# Patient Record
Sex: FEMALE | Race: BLACK OR AFRICAN AMERICAN | NOT HISPANIC OR LATINO | Employment: FULL TIME | URBAN - METROPOLITAN AREA
[De-identification: names, ages, dates, MRNs, and addresses within clinical notes are randomized per-mention and may not be internally consistent; named-entity substitution may affect disease eponyms.]

---

## 2022-06-22 ENCOUNTER — OFFICE VISIT (OUTPATIENT)
Dept: FAMILY MEDICINE | Facility: CLINIC | Age: 42
End: 2022-06-22
Payer: COMMERCIAL

## 2022-06-22 VITALS
BODY MASS INDEX: 39.75 KG/M2 | HEIGHT: 62 IN | WEIGHT: 216 LBS | TEMPERATURE: 99 F | SYSTOLIC BLOOD PRESSURE: 132 MMHG | HEART RATE: 84 BPM | OXYGEN SATURATION: 97 % | DIASTOLIC BLOOD PRESSURE: 84 MMHG

## 2022-06-22 DIAGNOSIS — R60.0 BILATERAL LOWER EXTREMITY EDEMA: ICD-10-CM

## 2022-06-22 DIAGNOSIS — Z76.89 ESTABLISHING CARE WITH NEW DOCTOR, ENCOUNTER FOR: Primary | ICD-10-CM

## 2022-06-22 DIAGNOSIS — Z79.899 ENCOUNTER FOR LONG-TERM (CURRENT) USE OF OTHER MEDICATIONS: ICD-10-CM

## 2022-06-22 DIAGNOSIS — Z12.31 OTHER SCREENING MAMMOGRAM: ICD-10-CM

## 2022-06-22 DIAGNOSIS — Z11.59 ENCOUNTER FOR HEPATITIS C SCREENING TEST FOR LOW RISK PATIENT: ICD-10-CM

## 2022-06-22 DIAGNOSIS — U07.1 COVID-19 VIRUS INFECTION: ICD-10-CM

## 2022-06-22 LAB
CTP QC/QA: YES
SARS-COV-2 RDRP RESP QL NAA+PROBE: POSITIVE

## 2022-06-22 PROCEDURE — 1160F PR REVIEW ALL MEDS BY PRESCRIBER/CLIN PHARMACIST DOCUMENTED: ICD-10-PCS | Mod: CPTII,S$GLB,, | Performed by: PHYSICIAN ASSISTANT

## 2022-06-22 PROCEDURE — 3079F PR MOST RECENT DIASTOLIC BLOOD PRESSURE 80-89 MM HG: ICD-10-PCS | Mod: CPTII,S$GLB,, | Performed by: PHYSICIAN ASSISTANT

## 2022-06-22 PROCEDURE — 99386 PREV VISIT NEW AGE 40-64: CPT | Mod: S$GLB,,, | Performed by: PHYSICIAN ASSISTANT

## 2022-06-22 PROCEDURE — U0002 COVID-19 LAB TEST NON-CDC: HCPCS | Mod: QW,S$GLB,, | Performed by: PHYSICIAN ASSISTANT

## 2022-06-22 PROCEDURE — 3008F BODY MASS INDEX DOCD: CPT | Mod: CPTII,S$GLB,, | Performed by: PHYSICIAN ASSISTANT

## 2022-06-22 PROCEDURE — U0002: ICD-10-PCS | Mod: QW,S$GLB,, | Performed by: PHYSICIAN ASSISTANT

## 2022-06-22 PROCEDURE — 1159F PR MEDICATION LIST DOCUMENTED IN MEDICAL RECORD: ICD-10-PCS | Mod: CPTII,S$GLB,, | Performed by: PHYSICIAN ASSISTANT

## 2022-06-22 PROCEDURE — 3079F DIAST BP 80-89 MM HG: CPT | Mod: CPTII,S$GLB,, | Performed by: PHYSICIAN ASSISTANT

## 2022-06-22 PROCEDURE — 3075F PR MOST RECENT SYSTOLIC BLOOD PRESS GE 130-139MM HG: ICD-10-PCS | Mod: CPTII,S$GLB,, | Performed by: PHYSICIAN ASSISTANT

## 2022-06-22 PROCEDURE — 3008F PR BODY MASS INDEX (BMI) DOCUMENTED: ICD-10-PCS | Mod: CPTII,S$GLB,, | Performed by: PHYSICIAN ASSISTANT

## 2022-06-22 PROCEDURE — 1160F RVW MEDS BY RX/DR IN RCRD: CPT | Mod: CPTII,S$GLB,, | Performed by: PHYSICIAN ASSISTANT

## 2022-06-22 PROCEDURE — 99386 PR PREVENTIVE VISIT,NEW,40-64: ICD-10-PCS | Mod: S$GLB,,, | Performed by: PHYSICIAN ASSISTANT

## 2022-06-22 PROCEDURE — 1159F MED LIST DOCD IN RCRD: CPT | Mod: CPTII,S$GLB,, | Performed by: PHYSICIAN ASSISTANT

## 2022-06-22 PROCEDURE — 3075F SYST BP GE 130 - 139MM HG: CPT | Mod: CPTII,S$GLB,, | Performed by: PHYSICIAN ASSISTANT

## 2022-06-22 NOTE — PROGRESS NOTES
"  SUBJECTIVE:    Patient ID: Louisa Soriano is a 41 y.o. female.    Chief Complaint: Establish Care (No bottles, not taking any meds/ here to establish care/ has mammogram and pap scheduled 7/12/22 with Dr. Coles/ marilee), Sore Throat (Sore throat x2days, started having coughing/ lc), and Foot Pain (Has swelling of bilat feet swelling on days she works, she is constantly on her feet/ lc)    Pt is a 41 y.o. female who presents today as a new patient to establish care. Her PMHx, SocHx, SugHx, and FamHx was reviewed and documented in her chart.  She does not have any previous PCP and was recommended to establish care by her GYN doctor.  Currently, she is a  at [employer].  She does not follow any particular diet and averages 1 meal/day with frequent snacks ("junk food") in between.  She exercises regularly, as she rides a [employer] 3x/week for 20-30 minutes/session.  She denies any smoking history or recreational drug use.  She does not drink EtOH.    Overall, she reports feeling well but does report an acute onset of a sore throat that began 2 days ago.  Accompanied with a sore throat is a non-productive cough. Nothing tends to exacerbated these symptoms, and the cough tends to be intermittently present throughout the day.  At this time, she has been using cough drops daily, but it is only minimally efficacious.  She denies being exposed to any known sick contacts, experiencing fevers, chills, headaches, myalgia, or diarrhea.    Dr. Coles (GYN) - manages her PAPs and mammograms q.d., both scheduled to be completed on 07/12/2022.    She subjectively reports having a UTD mammogram and reports it was negative.  These records are not made available today, but will be requested.    She has received 2 doses of the Moderna COVID-19 vaccine.     Office Visit on 06/22/2022   Component Date Value Ref Range Status    POC Rapid COVID 06/22/2022 Positive (A) Negative Final     Acceptable 06/22/2022 Yes   " "Final       History reviewed. No pertinent past medical history.  Social History     Socioeconomic History    Marital status: Single    Number of children: 2   Occupational History    Occupation:    Tobacco Use    Smoking status: Never Smoker    Smokeless tobacco: Never Used   Substance and Sexual Activity    Alcohol use: Not Currently    Drug use: Never     Past Surgical History:   Procedure Laterality Date     SECTION      REDUCTION OF BOTH BREASTS      TUBAL LIGATION       Family History   Problem Relation Age of Onset    Diabetes Mother     No Known Problems Father     Hypertension Sister     Hypertension Sister        Review of patient's allergies indicates:  No Known Allergies    Current Outpatient Medications:     nirmatrelvir-ritonavir 150 mg x 2- 100 mg copackaged tablets (EUA), Take 3 tablets by mouth 2 (two) times daily for 5 days. Each dose contains 2 nirmatrelvir (pink tablets) and 1 ritonavir (white tablet). Take all 3 tablets together, Disp: 30 tablet, Rfl: 0    Review of Systems   Constitutional: Negative for activity change, chills, fatigue and fever.   HENT: Positive for congestion, postnasal drip and sore throat. Negative for ear discharge, ear pain and rhinorrhea.    Respiratory: Positive for cough. Negative for shortness of breath and wheezing.    Cardiovascular: Positive for leg swelling ("After being on my feet all day. It is worse at the end of the day."). Negative for chest pain and palpitations.   Gastrointestinal: Negative for abdominal pain, constipation, diarrhea, nausea and vomiting.   Genitourinary: Negative for difficulty urinating, dysuria, frequency, pelvic pain and urgency.   Musculoskeletal: Negative for arthralgias and myalgias.   Neurological: Negative for dizziness, syncope, weakness, light-headedness and headaches.   Psychiatric/Behavioral: Negative for behavioral problems.          Objective:      Vitals:    22 0854   BP: 132/84 " "  Pulse: 84   Temp: 99 °F (37.2 °C)   SpO2: 97%   Weight: 98 kg (216 lb)   Height: 5' 2" (1.575 m)     Physical Exam  Vitals and nursing note reviewed.   Constitutional:       General: She is not in acute distress.     Appearance: Normal appearance. She is obese. She is not ill-appearing, toxic-appearing or diaphoretic.      Comments: Morbidly obese AAF sitting erect in office chair in no acute distress.   HENT:      Head: Normocephalic and atraumatic.      Right Ear: Tympanic membrane, ear canal and external ear normal. There is no impacted cerumen.      Left Ear: Tympanic membrane, ear canal and external ear normal. There is no impacted cerumen.      Nose: Nose normal. No congestion or rhinorrhea.      Mouth/Throat:      Mouth: Mucous membranes are moist.      Pharynx: Oropharynx is clear. Posterior oropharyngeal erythema present. No oropharyngeal exudate.   Eyes:      General: No scleral icterus.     Extraocular Movements: Extraocular movements intact.      Conjunctiva/sclera: Conjunctivae normal.      Pupils: Pupils are equal, round, and reactive to light.   Cardiovascular:      Rate and Rhythm: Normal rate and regular rhythm.      Pulses: Normal pulses.      Heart sounds: Normal heart sounds. No murmur heard.    No friction rub.   Pulmonary:      Effort: Pulmonary effort is normal. No respiratory distress.      Breath sounds: Normal breath sounds. No wheezing, rhonchi or rales.      Comments: Breathing comfortably on room air with no use of respiratory accessory muscles noted on inspection.  Adequate breath sounds appreciated throughout the diffuse bilateral lung fields with no signs of consolidation noted.  Abdominal:      General: There is no distension.      Palpations: Abdomen is soft.      Tenderness: There is no abdominal tenderness. There is no guarding or rebound.   Musculoskeletal:         General: No tenderness. Normal range of motion.      Cervical back: Normal range of motion and neck supple. No " rigidity.      Right lower leg: Edema (trace) present.      Left lower leg: Edema (trace) present.   Skin:     General: Skin is warm and dry.      Coloration: Skin is not jaundiced or pale.      Findings: No erythema or rash.   Neurological:      General: No focal deficit present.      Mental Status: She is alert. Mental status is at baseline.      Cranial Nerves: No cranial nerve deficit.      Coordination: Coordination normal.      Gait: Gait normal.   Psychiatric:         Mood and Affect: Mood normal.         Behavior: Behavior normal. Behavior is cooperative.           Assessment:       1. Establishing care with new doctor, encounter for    2. COVID-19 virus infection    3. Other screening mammogram    4. BMI 39.0-39.9,adult    5. Encounter for long-term (current) use of other medications    6. Encounter for hepatitis C screening test for low risk patient         Plan:       Establishing care with new doctor, encounter for  Comments:  Baseline lab work ordered and to be completed when patient is fasting.      Encounter for hepatitis-C screening in low risk patient  Patient amenable to having Hepatitis-C screening completed on her lab work.    Hepatitis C antibody screening ordered today and to be completed on fasting lab work.    COVID-19 virus infection  Comments:  POCT COVID-19 swab today - POSITIVE.  Start Paxlovid EUA therapy today - prescription sent to [facility].  Start OTC Mucinex-DM b.i.d. x 7-10 days.  Start OTC Zyrtec 10 mg q.d. and Flonase nasal spray q.h.s..  Take OTC Tylenol/NSAIDs for fever control, headache, or myalgias relief.  Remain well hydrated, drinking 6-8, 8 oz cups of water daily.  If symptoms worsen, present to the ER immediately.  Orders:  -     POCT COVID-19 Rapid Screening  -     nirmatrelvir-ritonavir 150 mg x 2- 100 mg copackaged tablets (EUA); Take 3 tablets by mouth 2 (two) times daily for 5 days. Each dose contains 2 nirmatrelvir (pink tablets) and 1 ritonavir (white  tablet). Take all 3 tablets together  Dispense: 30 tablet; Refill: 0    Bilateral lower extremity edema  Trace amounts noted on physical exam.  Begin following a low-salt diet.  Begin elevating lower extremities above heart level when stationary.  Begin wearing compression stockings daily.  If edema worsens, will consider starting diuretics in the future.    Other screening mammogram  Comments:  Mammogram to be completed with Dr. Coles (GYN) on 07/12/2022.  Last mammogram report requested today.    BMI 39.0-39.9,adult  Comments:  Begin following a dash diet and increase daily exercise.  Patient interested in starting weight loss medication.  Will follow-up in 6 weeks to discuss starting Adipex therapy for weight loss.    Patient reports her Pap smear will be completed on her visit with her GYN on 07/12/2022.    Follow up in about 6 weeks (around 8/3/2022) for discuss Adipex.        6/22/2022 Alvarado Reyes PA-C

## 2022-07-28 ENCOUNTER — TELEPHONE (OUTPATIENT)
Dept: FAMILY MEDICINE | Facility: CLINIC | Age: 42
End: 2022-07-28

## 2022-08-16 ENCOUNTER — TELEPHONE (OUTPATIENT)
Dept: FAMILY MEDICINE | Facility: CLINIC | Age: 42
End: 2022-08-16

## 2022-08-16 NOTE — TELEPHONE ENCOUNTER
I did not call her this morning. Called patient and she said she saw Dr Villafuerte this morning and her blood pressure was high. Said she needs appointment asap. I also informed her she is due for fasting lab and urine, orders at [facility].

## 2022-08-16 NOTE — TELEPHONE ENCOUNTER
----- Message from Zulema Cisneros MA sent at 8/16/2022 11:50 AM CDT -----  Regarding: returning your call  Patient returning your call from this am  [phone]

## 2022-08-17 ENCOUNTER — OFFICE VISIT (OUTPATIENT)
Dept: FAMILY MEDICINE | Facility: CLINIC | Age: 42
End: 2022-08-17
Payer: COMMERCIAL

## 2022-08-17 VITALS
HEIGHT: 62 IN | DIASTOLIC BLOOD PRESSURE: 96 MMHG | SYSTOLIC BLOOD PRESSURE: 134 MMHG | HEART RATE: 100 BPM | BODY MASS INDEX: 41.04 KG/M2 | WEIGHT: 223 LBS

## 2022-08-17 DIAGNOSIS — E66.01 CLASS 3 SEVERE OBESITY DUE TO EXCESS CALORIES WITHOUT SERIOUS COMORBIDITY WITH BODY MASS INDEX (BMI) OF 40.0 TO 44.9 IN ADULT: ICD-10-CM

## 2022-08-17 DIAGNOSIS — F41.9 ANXIETY: ICD-10-CM

## 2022-08-17 DIAGNOSIS — I10 PRIMARY HYPERTENSION: Primary | ICD-10-CM

## 2022-08-17 DIAGNOSIS — I10 ESSENTIAL HYPERTENSION: ICD-10-CM

## 2022-08-17 DIAGNOSIS — F41.1 GENERALIZED ANXIETY DISORDER: ICD-10-CM

## 2022-08-17 PROBLEM — E66.813 CLASS 3 SEVERE OBESITY DUE TO EXCESS CALORIES WITHOUT SERIOUS COMORBIDITY WITH BODY MASS INDEX (BMI) OF 40.0 TO 44.9 IN ADULT: Status: ACTIVE | Noted: 2022-08-17

## 2022-08-17 PROCEDURE — 3075F SYST BP GE 130 - 139MM HG: CPT | Mod: CPTII,S$GLB,, | Performed by: FAMILY MEDICINE

## 2022-08-17 PROCEDURE — 1159F PR MEDICATION LIST DOCUMENTED IN MEDICAL RECORD: ICD-10-PCS | Mod: CPTII,S$GLB,, | Performed by: FAMILY MEDICINE

## 2022-08-17 PROCEDURE — 3008F BODY MASS INDEX DOCD: CPT | Mod: CPTII,S$GLB,, | Performed by: FAMILY MEDICINE

## 2022-08-17 PROCEDURE — 3080F DIAST BP >= 90 MM HG: CPT | Mod: CPTII,S$GLB,, | Performed by: FAMILY MEDICINE

## 2022-08-17 PROCEDURE — 1159F MED LIST DOCD IN RCRD: CPT | Mod: CPTII,S$GLB,, | Performed by: FAMILY MEDICINE

## 2022-08-17 PROCEDURE — 3075F PR MOST RECENT SYSTOLIC BLOOD PRESS GE 130-139MM HG: ICD-10-PCS | Mod: CPTII,S$GLB,, | Performed by: FAMILY MEDICINE

## 2022-08-17 PROCEDURE — 99213 OFFICE O/P EST LOW 20 MIN: CPT | Mod: S$GLB,,, | Performed by: FAMILY MEDICINE

## 2022-08-17 PROCEDURE — 99213 PR OFFICE/OUTPT VISIT, EST, LEVL III, 20-29 MIN: ICD-10-PCS | Mod: S$GLB,,, | Performed by: FAMILY MEDICINE

## 2022-08-17 PROCEDURE — 3080F PR MOST RECENT DIASTOLIC BLOOD PRESSURE >= 90 MM HG: ICD-10-PCS | Mod: CPTII,S$GLB,, | Performed by: FAMILY MEDICINE

## 2022-08-17 PROCEDURE — 3008F PR BODY MASS INDEX (BMI) DOCUMENTED: ICD-10-PCS | Mod: CPTII,S$GLB,, | Performed by: FAMILY MEDICINE

## 2022-08-17 RX ORDER — ALPRAZOLAM 0.25 MG/1
0.25 TABLET ORAL NIGHTLY PRN
Qty: 30 TABLET | Refills: 0 | Status: SHIPPED | OUTPATIENT
Start: 2022-08-17 | End: 2022-09-16

## 2022-08-17 RX ORDER — HYDROCHLOROTHIAZIDE 25 MG/1
TABLET ORAL
Qty: 30 TABLET | Refills: 3 | Status: SHIPPED | OUTPATIENT
Start: 2022-08-17

## 2022-08-17 NOTE — PROGRESS NOTES
SUBJECTIVE:    Patient ID: Louisa Soriano is a 41 y.o. female.    Chief Complaint: Follow-up (No medicine // discuss about Blood Pressure // Mammogram done abc )    This 41-year-old female has a family history of hypertension in the mother and sister.  For 1 week she has had somewhat of a headache.  Had a recent gyn visit her blood pressure was elevated to 170/106.  She was told to go see her primary care and get this blood pressure addressed.  She has gained 7-10 lb over the last several months.  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    She loves salty foods and occasionally gets pedal edema after a long day of standing at work.    [email or web address] bike 30 minutes p.r.n. for cardio.    History of partial hysterectomy years ago.  Still has her ovaries.      Office Visit on 2022   Component Date Value Ref Range Status    POC Rapid COVID 2022 Positive (A) Negative Final     Acceptable 2022 Yes   Final       No past medical history on file.  Social History     Socioeconomic History    Marital status: Single    Number of children: 2   Occupational History    Occupation:    Tobacco Use    Smoking status: Never Smoker    Smokeless tobacco: Never Used   Substance and Sexual Activity    Alcohol use: Not Currently    Drug use: Never     Past Surgical History:   Procedure Laterality Date     SECTION      REDUCTION OF BOTH BREASTS      TUBAL LIGATION       Family History   Problem Relation Age of Onset    Diabetes Mother     No Known Problems Father     Hypertension Sister     Hypertension Sister        Review of patient's allergies indicates:  No Known Allergies    Current Outpatient Medications:     ALPRAZolam (XANAX) 0.25 MG tablet, Take 1 tablet (0.25 mg total) by mouth nightly as needed for Anxiety., Disp: 30 tablet, Rfl: 0    hydroCHLOROthiazide (HYDRODIURIL) 25 MG tablet, Take 1 tablet 3 times a week, Disp: 30 tablet, Rfl:  "3    Review of Systems   Constitutional: Negative for appetite change, chills, fatigue, fever and unexpected weight change.   HENT: Negative for congestion, ear pain, sinus pain, sore throat and trouble swallowing.    Eyes: Negative for pain, discharge and visual disturbance.   Respiratory: Negative for apnea, cough, shortness of breath and wheezing.    Cardiovascular: Negative for chest pain, palpitations and leg swelling.   Gastrointestinal: Negative for abdominal pain, blood in stool, constipation, diarrhea, nausea and vomiting.   Endocrine: Negative for heat intolerance, polydipsia and polyuria.   Genitourinary: Negative for difficulty urinating, dyspareunia, dysuria, frequency, hematuria and menstrual problem.   Musculoskeletal: Negative for arthralgias, back pain, gait problem, joint swelling and myalgias.   Allergic/Immunologic: Negative for environmental allergies, food allergies and immunocompromised state.   Neurological: Positive for headaches. Negative for dizziness, tremors, seizures and numbness.   Psychiatric/Behavioral: Negative for behavioral problems, confusion, hallucinations and suicidal ideas. The patient is not nervous/anxious.           Objective:      Vitals:    08/17/22 1416   BP: (!) 134/96   Pulse: 100   Weight: 101.2 kg (223 lb)   Height: 5' 2" (1.575 m)     Physical Exam  Vitals and nursing note reviewed.   Constitutional:       General: She is not in acute distress.     Appearance: She is well-developed. She is obese. She is not toxic-appearing.   HENT:      Head: Normocephalic and atraumatic.      Right Ear: Tympanic membrane and external ear normal.      Left Ear: Tympanic membrane and external ear normal.      Nose: Nose normal.      Mouth/Throat:      Pharynx: Oropharynx is clear.   Eyes:      Pupils: Pupils are equal, round, and reactive to light.   Neck:      Thyroid: No thyromegaly.      Vascular: No carotid bruit.   Cardiovascular:      Rate and Rhythm: Normal rate and regular " rhythm.      Heart sounds: Normal heart sounds. No murmur heard.  Pulmonary:      Effort: Pulmonary effort is normal.      Breath sounds: Normal breath sounds. No wheezing or rales.   Abdominal:      General: Bowel sounds are normal. There is no distension.      Palpations: Abdomen is soft.      Tenderness: There is no abdominal tenderness.   Musculoskeletal:         General: No tenderness or deformity. Normal range of motion.      Cervical back: Normal range of motion and neck supple.      Lumbar back: Normal. No spasms.      Comments: Bends 90 degrees at  waist shoulders and knees good range of motion, trace pedal edema around the ankles.   Lymphadenopathy:      Cervical: No cervical adenopathy.   Skin:     General: Skin is warm and dry.      Findings: No rash.   Neurological:      Mental Status: She is alert and oriented to person, place, and time.      Cranial Nerves: No cranial nerve deficit.      Coordination: Coordination normal.   Psychiatric:         Mood and Affect: Mood normal.         Behavior: Behavior normal.         Thought Content: Thought content normal.         Judgment: Judgment normal.      Comments: Some anxiety present           Assessment:       1. Primary hypertension    2. Anxiety    3. Essential hypertension    4. Class 3 severe obesity due to excess calories without serious comorbidity with body mass index (BMI) of 40.0 to 44.9 in adult    5. Generalized anxiety disorder         Plan:       Primary hypertension  -     hydroCHLOROthiazide (HYDRODIURIL) 25 MG tablet; Take 1 tablet 3 times a week  Dispense: 30 tablet; Refill: 3  Will treat with hydrochlorothiazide 25 mg 3 days a week and p.r.n. fluid/ continue weight loss and exercise efforts  Anxiety  -     ALPRAZolam (XANAX) 0.25 MG tablet; Take 1 tablet (0.25 mg total) by mouth nightly as needed for Anxiety.  Dispense: 30 tablet; Refill: 0  Add Xanax at night as needed  Essential hypertension    Class 3 severe obesity due to excess  calories without serious comorbidity with body mass index (BMI) of 40.0 to 44.9 in adult  Recommended weight loss and carb reduction for patient.  She needs to lose  7 lb.  Routine labs scheduled to be done this week at [facility]  Generalized anxiety disorder  Xanax prescribed    No follow-ups on file.        8/17/2022 Rhett Mills

## 2023-06-29 PROBLEM — N39.3 FEMALE STRESS INCONTINENCE: Status: ACTIVE | Noted: 2018-05-08

## 2023-06-29 PROBLEM — Z80.3 FAMILY HISTORY OF BREAST CANCER: Status: ACTIVE | Noted: 2021-03-11

## 2024-06-05 ENCOUNTER — OFFICE VISIT (OUTPATIENT)
Dept: FAMILY MEDICINE | Facility: CLINIC | Age: 44
End: 2024-06-05
Payer: COMMERCIAL

## 2024-06-05 VITALS
HEART RATE: 86 BPM | OXYGEN SATURATION: 99 % | BODY MASS INDEX: 39.01 KG/M2 | WEIGHT: 212 LBS | DIASTOLIC BLOOD PRESSURE: 86 MMHG | HEIGHT: 62 IN | SYSTOLIC BLOOD PRESSURE: 134 MMHG

## 2024-06-05 DIAGNOSIS — E55.9 VITAMIN D DEFICIENCY: ICD-10-CM

## 2024-06-05 DIAGNOSIS — I10 PRIMARY HYPERTENSION: Primary | ICD-10-CM

## 2024-06-05 DIAGNOSIS — Z12.31 OTHER SCREENING MAMMOGRAM: ICD-10-CM

## 2024-06-05 DIAGNOSIS — Z13.220 SCREENING CHOLESTEROL LEVEL: ICD-10-CM

## 2024-06-05 DIAGNOSIS — Z13.1 SCREENING FOR DIABETES MELLITUS: ICD-10-CM

## 2024-06-05 DIAGNOSIS — Z79.899 ENCOUNTER FOR LONG-TERM (CURRENT) USE OF OTHER MEDICATIONS: ICD-10-CM

## 2024-06-05 DIAGNOSIS — Z00.00 WELLNESS EXAMINATION: ICD-10-CM

## 2024-06-05 DIAGNOSIS — F41.1 GENERALIZED ANXIETY DISORDER: ICD-10-CM

## 2024-06-05 DIAGNOSIS — Z51.81 ENCOUNTER FOR THERAPEUTIC DRUG MONITORING: ICD-10-CM

## 2024-06-05 PROCEDURE — 1159F MED LIST DOCD IN RCRD: CPT | Mod: CPTII,S$GLB,,

## 2024-06-05 PROCEDURE — 3066F NEPHROPATHY DOC TX: CPT | Mod: CPTII,S$GLB,,

## 2024-06-05 PROCEDURE — 99214 OFFICE O/P EST MOD 30 MIN: CPT | Mod: S$GLB,,,

## 2024-06-05 PROCEDURE — 3044F HG A1C LEVEL LT 7.0%: CPT | Mod: CPTII,S$GLB,,

## 2024-06-05 PROCEDURE — 3079F DIAST BP 80-89 MM HG: CPT | Mod: CPTII,S$GLB,,

## 2024-06-05 PROCEDURE — 3075F SYST BP GE 130 - 139MM HG: CPT | Mod: CPTII,S$GLB,,

## 2024-06-05 PROCEDURE — 3008F BODY MASS INDEX DOCD: CPT | Mod: CPTII,S$GLB,,

## 2024-06-05 PROCEDURE — 3061F NEG MICROALBUMINURIA REV: CPT | Mod: CPTII,S$GLB,,

## 2024-06-05 RX ORDER — HYDROCHLOROTHIAZIDE 25 MG/1
TABLET ORAL
Qty: 90 TABLET | Refills: 3 | Status: SHIPPED | OUTPATIENT
Start: 2024-06-05

## 2024-06-05 NOTE — PROGRESS NOTES
"  SUBJECTIVE:   HPI: Louisa Soriano  is a 43 y.o. female who presents for annual physical.    43 year old female here for "yearly exam."  Had eye exam recently and got new glasses. 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   No issues with bowels or bladder  Has had a hysterectomy around 20 years ago, does have one ovary, but she thinks she may be having symptoms of "menopause" and wonders if that remaining ovary is underfunctioning. She is established with a GYN.  She was taking semaglutide, but recently stopped due to side effects.  Currently only taking medication for blood pressure.  Agrees to labs today.  Hepatitis C Screening Never done  TETANUS VACCINE Never done  Mammogram due on 07/13/2024  Lipid Panel Completed             Office Visit on 06/05/2024   Component Date Value Ref Range Status    WBC 06/05/2024 5.4  3.8 - 10.8 Thousand/uL Final    RBC 06/05/2024 4.66  3.80 - 5.10 Million/uL Final    Hemoglobin 06/05/2024 14.2  11.7 - 15.5 g/dL Final    Hematocrit 06/05/2024 42.7  35.0 - 45.0 % Final    MCV 06/05/2024 91.6  80.0 - 100.0 fL Final    MCH 06/05/2024 30.5  27.0 - 33.0 pg Final    MCHC 06/05/2024 33.3  32.0 - 36.0 g/dL Final    RDW 06/05/2024 13.1  11.0 - 15.0 % Final    Platelets 06/05/2024 278  140 - 400 Thousand/uL Final    MPV 06/05/2024 10.3  7.5 - 12.5 fL Final    Neutrophils, Abs 06/05/2024 2,057  1,500 - 7,800 cells/uL Final    Lymph # 06/05/2024 2,803  850 - 3,900 cells/uL Final    Mono # 06/05/2024 373  200 - 950 cells/uL Final    Eos # 06/05/2024 119  15 - 500 cells/uL Final    Baso # 06/05/2024 49  0 - 200 cells/uL Final    Neutrophils Relative 06/05/2024 38.1  % Final    Lymph % 06/05/2024 51.9  % Final    Mono % 06/05/2024 6.9  % Final    Eosinophil % 06/05/2024 2.2  % Final    Basophil % 06/05/2024 0.9  % Final    Glucose 06/05/2024 96  65 - 99 mg/dL Final    BUN 06/05/2024 12  7 - 25 mg/dL Final    Creatinine 06/05/2024 0.64  0.50 - 0.99 mg/dL " Final    eGFR 06/05/2024 112  > OR = 60 mL/min/1.73m2 Final    BUN/Creatinine Ratio 06/05/2024 SEE NOTE:  6 - 22 (calc) Final    Sodium 06/05/2024 138  135 - 146 mmol/L Final    Potassium 06/05/2024 4.5  3.5 - 5.3 mmol/L Final    Chloride 06/05/2024 103  98 - 110 mmol/L Final    CO2 06/05/2024 30  20 - 32 mmol/L Final    Calcium 06/05/2024 9.6  8.6 - 10.2 mg/dL Final    Total Protein 06/05/2024 7.2  6.1 - 8.1 g/dL Final    Albumin 06/05/2024 4.1  3.6 - 5.1 g/dL Final    Globulin, Total 06/05/2024 3.1  1.9 - 3.7 g/dL (calc) Final    Albumin/Globulin Ratio 06/05/2024 1.3  1.0 - 2.5 (calc) Final    Total Bilirubin 06/05/2024 0.4  0.2 - 1.2 mg/dL Final    Alkaline Phosphatase 06/05/2024 67  31 - 125 U/L Final    AST 06/05/2024 16  10 - 30 U/L Final    ALT 06/05/2024 14  6 - 29 U/L Final    Cholesterol 06/05/2024 207 (H)  <200 mg/dL Final    HDL 06/05/2024 64  > OR = 50 mg/dL Final    Triglycerides 06/05/2024 93  <150 mg/dL Final    LDL Cholesterol 06/05/2024 124 (H)  mg/dL (calc) Final    HDL/Cholesterol Ratio 06/05/2024 3.2  <5.0 (calc) Final    Non HDL Chol. (LDL+VLDL) 06/05/2024 143 (H)  <130 mg/dL (calc) Final    Magnesium 06/05/2024 2.2  1.5 - 2.5 mg/dL Final    Creatinine, Urine 06/05/2024 61  20 - 275 mg/dL Final    Microalb, Ur 06/05/2024 <0.2  See Note: mg/dL Final    Microalb/Creat Ratio 06/05/2024 NOTE  <30 mg/g creat Final    TSH w/reflex to FT4 06/05/2024 1.16  mIU/L Final    Color, UA 06/05/2024 YELLOW  YELLOW Final    Appearance, UA 06/05/2024 CLEAR  CLEAR Final    Specific Gravity, UA 06/05/2024 1.015  1.001 - 1.035 Final    pH, UA 06/05/2024 7.0  5.0 - 8.0 Final    Glucose, UA 06/05/2024 NEGATIVE  NEGATIVE Final    Bilirubin, UA 06/05/2024 NEGATIVE  NEGATIVE Final    Ketones, UA 06/05/2024 NEGATIVE  NEGATIVE Final    Occult Blood UA 06/05/2024 NEGATIVE  NEGATIVE Final    Protein, UA 06/05/2024 NEGATIVE  NEGATIVE Final    Nitrite, UA 06/05/2024 NEGATIVE  NEGATIVE Final    Leukocytes, UA 06/05/2024  NEGATIVE  NEGATIVE Final    WBC Casts, UA 2024 NONE SEEN  < OR = 5 /HPF Final    RBC Casts, UA 2024 NONE SEEN  < OR = 2 /HPF Final    Squam Epithel, UA 2024 NONE SEEN  < OR = 5 /HPF Final    Bacteria, UA 2024 NONE SEEN  NONE SEEN /HPF Final    Hyaline Casts, UA 2024 NONE SEEN  NONE SEEN /LPF Final    Service Cmt: 2024 SEE COMMENT   Final    Reflexive Urine Culture 2024 SEE COMMENT   Final    Vitamin D, 25-OH, Total 2024 19 (L)  30 - 100 ng/mL Final    Hemoglobin A1C 2024 5.8 (H)  <5.7 % of total Hgb Final         Current Outpatient Medications on File Prior to Visit   Medication Sig Dispense Refill    ALPRAZolam (XANAX) 0.25 MG tablet Take 1 tablet (0.25 mg total) by mouth nightly as needed for Anxiety. (Patient not taking: Reported on 2024) 30 tablet 0     No current facility-administered medications on file prior to visit.     Past Medical History:   Diagnosis Date    Anemia     BRCA gene mutation negative     Class 3 severe obesity due to excess calories without serious comorbidity with body mass index (BMI) of 40.0 to 44.9 in adult 2022    Hypertension     Vitamin D deficiency 2018: vit D 8.5       @[email or web address]  Past Surgical History:   Procedure Laterality Date     SECTION      HYSTERECTOMY      REDUCTION OF BOTH BREASTS      TOTAL REDUCTION MAMMOPLASTY      TUBAL LIGATION       Family History   Problem Relation Name Age of Onset    Diabetes Mother      No Known Problems Father      Breast cancer Sister  37    Hypertension Sister      Hypertension Sister      Ovarian cancer Neg Hx         Marital Status: Single  Alcohol History:  reports that she does not currently use alcohol.  Tobacco History:  reports that she has never smoked. She has never used smokeless tobacco.  Drug History:  reports no history of drug use.    Health Maintenance Topics with due status: Not Due       Topic Last Completion Date    Hemoglobin A1c  "(Diabetic Prevention Screening) 06/05/2024    Influenza Vaccine Not Due     Immunization History   Administered Date(s) Administered    COVID-19, MRNA, LN-S, PF (MODERNA FULL 0.5 ML DOSE) 03/12/2021, 04/08/2021       Review of patient's allergies indicates:  No Known Allergies    Current Outpatient Medications:     ALPRAZolam (XANAX) 0.25 MG tablet, Take 1 tablet (0.25 mg total) by mouth nightly as needed for Anxiety. (Patient not taking: Reported on 6/5/2024), Disp: 30 tablet, Rfl: 0    cholecalciferol, vitamin D3, (VITAMIN D3) 125 mcg (5,000 unit) Tab, Take 5,000 Units by mouth once daily., Disp: , Rfl:     hydroCHLOROthiazide (HYDRODIURIL) 25 MG tablet, Take 1 tablet 3 times a week, Disp: 90 tablet, Rfl: 3    Review of Systems   Constitutional:  Negative for activity change and unexpected weight change.   HENT:  Negative for hearing loss, rhinorrhea and trouble swallowing.    Eyes:  Negative for discharge and visual disturbance.   Respiratory:  Negative for chest tightness and wheezing.    Cardiovascular:  Negative for chest pain and palpitations.   Gastrointestinal:  Negative for blood in stool, constipation, diarrhea and vomiting.   Endocrine: Negative for polydipsia and polyuria.   Genitourinary:  Negative for difficulty urinating, dysuria, hematuria and menstrual problem.   Musculoskeletal:  Negative for arthralgias, joint swelling and neck pain.   Neurological:  Negative for weakness and headaches.   Psychiatric/Behavioral:  Negative for confusion and dysphoric mood.        OBJECTIVE:      Vitals:    06/05/24 0814   BP: 134/86   Pulse: 86   SpO2: 99%   Weight: 96.2 kg (212 lb)   Height: 5' 2" (1.575 m)     Physical Exam  Vitals and nursing note reviewed.   Constitutional:       General: She is not in acute distress.     Appearance: Normal appearance. She is well-developed. She is not ill-appearing.   HENT:      Head: Normocephalic and atraumatic.      Right Ear: Tympanic membrane and external ear normal.     "  Left Ear: Tympanic membrane and external ear normal.      Nose: Nose normal.      Mouth/Throat:      Lips: Pink.      Pharynx: Oropharynx is clear.   Eyes:      General: No scleral icterus.     Pupils: Pupils are equal, round, and reactive to light.   Neck:      Thyroid: No thyromegaly.      Vascular: No carotid bruit.   Cardiovascular:      Rate and Rhythm: Normal rate and regular rhythm.      Pulses:           Radial pulses are 2+ on the right side and 2+ on the left side.      Heart sounds: Normal heart sounds. No murmur heard.  Pulmonary:      Effort: Pulmonary effort is normal.      Breath sounds: Normal breath sounds.   Abdominal:      General: Bowel sounds are normal.      Palpations: Abdomen is soft.      Tenderness: There is no abdominal tenderness.   Musculoskeletal:         General: Normal range of motion.      Cervical back: Normal range of motion.      Lumbar back: Normal. No spasms.      Right lower leg: No edema.      Left lower leg: No edema.   Skin:     General: Skin is warm and dry.      Capillary Refill: Capillary refill takes less than 2 seconds.   Neurological:      General: No focal deficit present.      Mental Status: She is alert and oriented to person, place, and time.      Cranial Nerves: No cranial nerve deficit.      Sensory: Sensation is intact.      Motor: No weakness.      Gait: Gait is intact.   Psychiatric:         Attention and Perception: Attention normal.         Mood and Affect: Mood normal.         Speech: Speech normal.         Behavior: Behavior is cooperative.         Thought Content: Thought content does not include homicidal or suicidal ideation.        Assessment:       1. Primary hypertension    2. Wellness examination    3. Vitamin D deficiency    4. Generalized anxiety disorder    5. Encounter for long-term (current) use of other medications    6. Encounter for therapeutic drug monitoring    7. Screening cholesterol level    8. Screening for diabetes mellitus    9.  Other screening mammogram        Plan:       Primary hypertension  Comments:  labs for evaluation. continue HCTZ.  Orders:  -     hydroCHLOROthiazide (HYDRODIURIL) 25 MG tablet; Take 1 tablet 3 times a week  Dispense: 90 tablet; Refill: 3    Wellness examination  -     CBC Auto Differential; Future; Expected date: 06/05/2024  -     Comprehensive Metabolic Panel; Future; Expected date: 06/05/2024  -     Lipid Panel; Future; Expected date: 06/05/2024  -     Magnesium; Future; Expected date: 06/05/2024  -     Microalbumin/Creatinine Ratio, Urine; Future; Expected date: 06/05/2024  -     TSH w/reflex to FT4; Future; Expected date: 06/05/2024  -     Urinalysis, Reflex to Urine Culture Urine, Clean Catch; Future; Expected date: 06/05/2024  -     Cancel: Hemoglobin A1C; Future; Expected date: 06/05/2024  -     HEMOGLOBIN A1C; Future; Expected date: 06/05/2024    Vitamin D deficiency  Comments:  labs for evaluation  Orders:  -     Cancel: Vitamin D; Future; Expected date: 06/05/2024  -     Vitamin D; Future; Expected date: 06/05/2024    Generalized anxiety disorder  Comments:  not on medication.    Encounter for long-term (current) use of other medications  Comments:  labs for evaluation    Encounter for therapeutic drug monitoring  Comments:  labs for evaluation    Screening cholesterol level  Comments:  labs for evaluation  Orders:  -     Lipid Panel; Future; Expected date: 06/05/2024    Screening for diabetes mellitus  Comments:  labs for evaluation  Orders:  -     Cancel: Hemoglobin A1C; Future; Expected date: 06/05/2024  -     HEMOGLOBIN A1C; Future; Expected date: 06/05/2024    Other screening mammogram  Comments:  due for screening exam  Orders:  -     Mammo Digital Screening Bilat w/ Rui; Future; Expected date: 07/05/2024    Other orders  -     Vitamin D  -     Hemoglobin A1C        Counseled on age and gender appropriate medical preventative services, including cancer screenings, immunizations, overall nutritional  health, need for a consistent exercise regimen and an overall push towards maintaining a vigorous and active lifestyle.      Follow up in about 6 months (around 12/5/2024) for HTN, Nurse BP Check, 2 weeks.          6/15/2024 Elizabeth Duarte M.D.

## 2024-06-05 NOTE — PATIENT INSTRUCTIONS
Luis Jasso,     If you are due for any health screening(s) below please notify me so we can arrange them to be ordered and scheduled. Most healthy patients at your age complete them, but you are free to accept or refuse.     If you can't do it, I'll definitely understand. If you can, I'd certainly appreciate it!    Tests to Keep You Healthy    Mammogram: Met on 7/13/2023  Last Blood Pressure <= 139/89 (6/5/2024): NO

## 2024-06-06 LAB
25(OH)D3+25(OH)D2 SERPL-MCNC: 19 NG/ML (ref 30–100)
ALBUMIN SERPL-MCNC: 4.1 G/DL (ref 3.6–5.1)
ALBUMIN/CREAT UR: NORMAL MG/G CREAT
ALBUMIN/GLOB SERPL: 1.3 (CALC) (ref 1–2.5)
ALP SERPL-CCNC: 67 U/L (ref 31–125)
ALT SERPL-CCNC: 14 U/L (ref 6–29)
APPEARANCE UR: CLEAR
AST SERPL-CCNC: 16 U/L (ref 10–30)
BACTERIA #/AREA URNS HPF: NORMAL /HPF
BACTERIA UR CULT: NORMAL
BASOPHILS # BLD AUTO: 49 CELLS/UL (ref 0–200)
BASOPHILS NFR BLD AUTO: 0.9 %
BILIRUB SERPL-MCNC: 0.4 MG/DL (ref 0.2–1.2)
BILIRUB UR QL STRIP: NEGATIVE
BUN SERPL-MCNC: 12 MG/DL (ref 7–25)
BUN/CREAT SERPL: NORMAL (CALC) (ref 6–22)
CALCIUM SERPL-MCNC: 9.6 MG/DL (ref 8.6–10.2)
CHLORIDE SERPL-SCNC: 103 MMOL/L (ref 98–110)
CHOLEST SERPL-MCNC: 207 MG/DL
CHOLEST/HDLC SERPL: 3.2 (CALC)
CO2 SERPL-SCNC: 30 MMOL/L (ref 20–32)
COLOR UR: YELLOW
CREAT SERPL-MCNC: 0.64 MG/DL (ref 0.5–0.99)
CREAT UR-MCNC: 61 MG/DL (ref 20–275)
EGFR: 112 ML/MIN/1.73M2
EOSINOPHIL # BLD AUTO: 119 CELLS/UL (ref 15–500)
EOSINOPHIL NFR BLD AUTO: 2.2 %
ERYTHROCYTE [DISTWIDTH] IN BLOOD BY AUTOMATED COUNT: 13.1 % (ref 11–15)
GLOBULIN SER CALC-MCNC: 3.1 G/DL (CALC) (ref 1.9–3.7)
GLUCOSE SERPL-MCNC: 96 MG/DL (ref 65–99)
GLUCOSE UR QL STRIP: NEGATIVE
HBA1C MFR BLD: 5.8 % OF TOTAL HGB
HCT VFR BLD AUTO: 42.7 % (ref 35–45)
HDLC SERPL-MCNC: 64 MG/DL
HGB BLD-MCNC: 14.2 G/DL (ref 11.7–15.5)
HGB UR QL STRIP: NEGATIVE
HYALINE CASTS #/AREA URNS LPF: NORMAL /LPF
KETONES UR QL STRIP: NEGATIVE
LDLC SERPL CALC-MCNC: 124 MG/DL (CALC)
LEUKOCYTE ESTERASE UR QL STRIP: NEGATIVE
LYMPHOCYTES # BLD AUTO: 2803 CELLS/UL (ref 850–3900)
LYMPHOCYTES NFR BLD AUTO: 51.9 %
MAGNESIUM SERPL-MCNC: 2.2 MG/DL (ref 1.5–2.5)
MCH RBC QN AUTO: 30.5 PG (ref 27–33)
MCHC RBC AUTO-ENTMCNC: 33.3 G/DL (ref 32–36)
MCV RBC AUTO: 91.6 FL (ref 80–100)
MICROALBUMIN UR-MCNC: <0.2 MG/DL
MONOCYTES # BLD AUTO: 373 CELLS/UL (ref 200–950)
MONOCYTES NFR BLD AUTO: 6.9 %
NEUTROPHILS # BLD AUTO: 2057 CELLS/UL (ref 1500–7800)
NEUTROPHILS NFR BLD AUTO: 38.1 %
NITRITE UR QL STRIP: NEGATIVE
NONHDLC SERPL-MCNC: 143 MG/DL (CALC)
PH UR STRIP: 7 [PH] (ref 5–8)
PLATELET # BLD AUTO: 278 THOUSAND/UL (ref 140–400)
PMV BLD REES-ECKER: 10.3 FL (ref 7.5–12.5)
POTASSIUM SERPL-SCNC: 4.5 MMOL/L (ref 3.5–5.3)
PROT SERPL-MCNC: 7.2 G/DL (ref 6.1–8.1)
PROT UR QL STRIP: NEGATIVE
RBC # BLD AUTO: 4.66 MILLION/UL (ref 3.8–5.1)
RBC #/AREA URNS HPF: NORMAL /HPF
SERVICE CMNT-IMP: NORMAL
SODIUM SERPL-SCNC: 138 MMOL/L (ref 135–146)
SP GR UR STRIP: 1.01 (ref 1–1.03)
SQUAMOUS #/AREA URNS HPF: NORMAL /HPF
TRIGL SERPL-MCNC: 93 MG/DL
TSH SERPL-ACNC: 1.16 MIU/L
WBC # BLD AUTO: 5.4 THOUSAND/UL (ref 3.8–10.8)
WBC #/AREA URNS HPF: NORMAL /HPF

## 2024-06-06 NOTE — PROGRESS NOTES
Let patient know her cholesterol is ok. Nothing some adjustments in diet and exercise can't fix. More cardio, more fiber, veggies, less animal fats.  Her vitamin d level is very low. I would like her to take some Vitamin D3 over the counter, 5000 iu's daily.   A1c is prediabetic level. Again, can be corrected with some lifestyle changes.  Other labs look great.

## 2024-06-07 ENCOUNTER — TELEPHONE (OUTPATIENT)
Dept: FAMILY MEDICINE | Facility: CLINIC | Age: 44
End: 2024-06-07
Payer: COMMERCIAL

## 2024-06-07 RX ORDER — ACETAMINOPHEN 500 MG
5000 TABLET ORAL DAILY
COMMUNITY

## 2024-06-07 NOTE — TELEPHONE ENCOUNTER
----- Message from DEMETRIUS Manuel sent at 6/6/2024  5:10 PM CDT -----  Let patient know her cholesterol is ok. Nothing some adjustments in diet and exercise can't fix. More cardio, more fiber, veggies, less animal fats.  Her vitamin d level is very low. I would like her to take some Vitamin D3 over the counter, 5000 iu's daily.   A1c is prediabetic level. Again, can be corrected with some lifestyle changes.  Other labs look great.

## 2024-06-15 PROBLEM — Z79.899 ENCOUNTER FOR LONG-TERM (CURRENT) USE OF OTHER MEDICATIONS: Status: ACTIVE | Noted: 2024-06-15

## 2024-06-15 PROBLEM — E55.9 VITAMIN D DEFICIENCY: Status: RESOLVED | Noted: 2018-05-11 | Resolved: 2024-06-15

## 2024-06-15 PROBLEM — E66.813 CLASS 3 SEVERE OBESITY DUE TO EXCESS CALORIES WITHOUT SERIOUS COMORBIDITY WITH BODY MASS INDEX (BMI) OF 40.0 TO 44.9 IN ADULT: Status: RESOLVED | Noted: 2022-08-17 | Resolved: 2024-06-15

## 2024-06-15 PROBLEM — E66.01 CLASS 3 SEVERE OBESITY DUE TO EXCESS CALORIES WITHOUT SERIOUS COMORBIDITY WITH BODY MASS INDEX (BMI) OF 40.0 TO 44.9 IN ADULT: Status: RESOLVED | Noted: 2022-08-17 | Resolved: 2024-06-15

## 2024-06-15 PROBLEM — F41.9 ANXIETY: Status: ACTIVE | Noted: 2024-06-15

## 2024-07-01 PROBLEM — Z01.419 WELL WOMAN EXAM WITH ROUTINE GYNECOLOGICAL EXAM: Status: ACTIVE | Noted: 2024-06-15

## 2024-07-05 ENCOUNTER — HOSPITAL ENCOUNTER (OUTPATIENT)
Dept: RADIOLOGY | Facility: HOSPITAL | Age: 44
Discharge: HOME | End: 2024-07-05
Payer: COMMERCIAL

## 2024-07-05 DIAGNOSIS — Z12.31 OTHER SCREENING MAMMOGRAM: ICD-10-CM

## 2024-07-05 PROCEDURE — 77067 SCR MAMMO BI INCL CAD: CPT | Mod: 26,,, | Performed by: RADIOLOGY

## 2024-07-05 PROCEDURE — 77063 BREAST TOMOSYNTHESIS BI: CPT | Mod: TC,PO

## 2024-07-05 PROCEDURE — 77067 SCR MAMMO BI INCL CAD: CPT | Mod: TC,PO

## 2024-07-05 PROCEDURE — 77063 BREAST TOMOSYNTHESIS BI: CPT | Mod: 26,,, | Performed by: RADIOLOGY

## 2024-07-08 ENCOUNTER — TELEPHONE (OUTPATIENT)
Dept: FAMILY MEDICINE | Facility: CLINIC | Age: 44
End: 2024-07-08
Payer: COMMERCIAL

## 2024-07-08 NOTE — TELEPHONE ENCOUNTER
----- Message from DEMETRIUS Manuel sent at 7/8/2024  8:17 AM CDT -----  Please let patient know mammogram normal. Repeat annually.

## 2024-07-10 ENCOUNTER — CLINICAL SUPPORT (OUTPATIENT)
Dept: FAMILY MEDICINE | Facility: CLINIC | Age: 44
End: 2024-07-10
Payer: COMMERCIAL

## 2024-07-10 VITALS — DIASTOLIC BLOOD PRESSURE: 90 MMHG | SYSTOLIC BLOOD PRESSURE: 124 MMHG | HEART RATE: 104 BPM

## 2024-07-10 DIAGNOSIS — I10 PRIMARY HYPERTENSION: Primary | ICD-10-CM

## 2024-07-10 PROCEDURE — 99499 UNLISTED E&M SERVICE: CPT | Mod: S$GLB,,, | Performed by: FAMILY MEDICINE

## 2024-07-10 RX ORDER — AMLODIPINE BESYLATE 2.5 MG/1
2.5 TABLET ORAL NIGHTLY
Qty: 90 TABLET | Refills: 3 | Status: SHIPPED | OUTPATIENT
Start: 2024-07-10 | End: 2025-07-10

## 2024-07-10 NOTE — PROGRESS NOTES
"Pt here for BP check. BP log brought. Verified pt taking BP meds as prescribed. Per Elizabeth, "Start Amlodipine 5mg @bed time. Keep checking BP at home, keep log. Repeat NV in 2 weeks." Appt scheduled.   "

## 2024-07-24 ENCOUNTER — CLINICAL SUPPORT (OUTPATIENT)
Dept: FAMILY MEDICINE | Facility: CLINIC | Age: 44
End: 2024-07-24
Payer: COMMERCIAL

## 2024-07-24 VITALS — DIASTOLIC BLOOD PRESSURE: 88 MMHG | SYSTOLIC BLOOD PRESSURE: 122 MMHG | HEART RATE: 107 BPM

## 2024-07-24 DIAGNOSIS — I10 PRIMARY HYPERTENSION: Primary | ICD-10-CM

## 2024-07-24 RX ORDER — AMLODIPINE BESYLATE 5 MG/1
5 TABLET ORAL DAILY
Qty: 90 TABLET | Refills: 3 | Status: SHIPPED | OUTPATIENT
Start: 2024-07-24 | End: 2025-07-24

## 2024-07-24 NOTE — PROGRESS NOTES
"Pt here for BP check. Bp log brought. Pt states she bought a new BP cuff with no change in her at home BPs. Per Elizabeth, "Increase Amlodipine to 5mg daily." Informed pt. She voiced understanding.  "

## 2024-11-05 ENCOUNTER — TELEPHONE (OUTPATIENT)
Dept: FAMILY MEDICINE | Facility: CLINIC | Age: 44
End: 2024-11-05

## 2024-11-05 ENCOUNTER — OFFICE VISIT (OUTPATIENT)
Dept: FAMILY MEDICINE | Facility: CLINIC | Age: 44
End: 2024-11-05
Payer: COMMERCIAL

## 2024-11-05 VITALS
OXYGEN SATURATION: 99 % | HEIGHT: 62 IN | HEART RATE: 90 BPM | SYSTOLIC BLOOD PRESSURE: 122 MMHG | DIASTOLIC BLOOD PRESSURE: 84 MMHG | BODY MASS INDEX: 41.22 KG/M2 | WEIGHT: 224 LBS

## 2024-11-05 DIAGNOSIS — R07.9 CHEST PAIN, UNSPECIFIED TYPE: Primary | ICD-10-CM

## 2024-11-05 DIAGNOSIS — F41.9 ANXIETY: ICD-10-CM

## 2024-11-05 LAB
EKG 12-LEAD: NORMAL
PR INTERVAL: 164
PRT AXES: NORMAL
QRS DURATION: 94
QT/QTC: NORMAL
VENTRICULAR RATE: 94

## 2024-11-05 PROCEDURE — 3074F SYST BP LT 130 MM HG: CPT | Mod: CPTII,S$GLB,,

## 2024-11-05 PROCEDURE — 3079F DIAST BP 80-89 MM HG: CPT | Mod: CPTII,S$GLB,,

## 2024-11-05 PROCEDURE — 3061F NEG MICROALBUMINURIA REV: CPT | Mod: CPTII,S$GLB,,

## 2024-11-05 PROCEDURE — 3044F HG A1C LEVEL LT 7.0%: CPT | Mod: CPTII,S$GLB,,

## 2024-11-05 PROCEDURE — 93000 ELECTROCARDIOGRAM COMPLETE: CPT | Mod: S$GLB,,,

## 2024-11-05 PROCEDURE — 3008F BODY MASS INDEX DOCD: CPT | Mod: CPTII,S$GLB,,

## 2024-11-05 PROCEDURE — 3066F NEPHROPATHY DOC TX: CPT | Mod: CPTII,S$GLB,,

## 2024-11-05 PROCEDURE — 99213 OFFICE O/P EST LOW 20 MIN: CPT | Mod: S$GLB,,,

## 2024-11-05 RX ORDER — ESCITALOPRAM OXALATE 5 MG/1
5 TABLET ORAL DAILY
Qty: 30 TABLET | Refills: 11 | Status: SHIPPED | OUTPATIENT
Start: 2024-11-05 | End: 2025-11-05

## 2024-11-05 RX ORDER — PHENTERMINE HYDROCHLORIDE 37.5 MG/1
37.5 TABLET ORAL
COMMUNITY
Start: 2024-06-08

## 2024-11-05 RX ORDER — ALPRAZOLAM 0.25 MG/1
0.25 TABLET ORAL NIGHTLY PRN
Qty: 20 TABLET | Refills: 0 | Status: SHIPPED | OUTPATIENT
Start: 2024-11-05

## 2024-11-05 NOTE — TELEPHONE ENCOUNTER
----- Message from Elizabeth sent at 11/5/2024  9:23 AM CST -----  - 8:49- pt is calling juanito freitas   [phone]

## 2024-11-05 NOTE — PATIENT INSTRUCTIONS
Luis Jasso,     If you are due for any health screening(s) below please notify me so we can arrange them to be ordered and scheduled. Most healthy patients at your age complete them, but you are free to accept or refuse.     If you can't do it, I'll definitely understand. If you can, I'd certainly appreciate it!    All of your core healthy metrics are met.

## 2024-11-05 NOTE — TELEPHONE ENCOUNTER
From pt scheduling messaging:      ===View-only below this line===      ----- Message -----       From:Louisa Soriano       Sent:11/5/2024  8:00 AM CST         To:Patient Appointment Schedule Request Message List    Subject:Appointment Request    Generally in the evening time and yes I have been having headaches       ----- Message -----       From:Nurse Trevino       Sent:11/5/2024  7:49 AM CST         To:Louisa Soriano    Subject:Appointment Request    Does it happen when you eat? Are you having any head aches or blurred vision?      ----- Message -----       From:Louisa Soriano       Sent:11/4/2024  5:10 PM CST         To:Patient Appointment Schedule Request Message List    Subject:Appointment Request     Very slight pains maybe once or twice a day and they go away just happening and I dont recall this happening ever before maybe a week or so ago       ----- Message -----       From:Nurse Trevino       Sent:11/4/2024  3:15 PM CST         To:Louisa Soriano    Subject:Appointment Request    Good Afternoon!  What kind of pains are you having? How often are you having them? Does the pain radiate anywhere?      ----- Message -----       From:Louisa Soriano       Sent:11/4/2024  3:09 PM CST         To:DEMETRIUS Manuel    Subject:Appointment Request    Appointment Request From: Louisa Soriano    With Provider: DEMETRIUS Manuel [[facility]]    Preferred Date Range: 11/5/2024 - 11/5/2024    Preferred Times: Any Time    Reason for visit: Office Visit    Comments:  Slight chest pains from time to time

## 2024-11-05 NOTE — PROGRESS NOTES
SUBJECTIVE:    Patient ID: Louisa Soriano is a 43 y.o. female.    Chief Complaint: Follow-up (No bottles//Pt c/o intermittent chest pains x 2 weeks that usually happen in the evening. Have been having random HA. Denies blurry vision. Noticed thigh gets tingly at times. Taking BP meds. Home Bps running good//declines flu vacc/JL)    HPI  History of Present Illness    CHIEF COMPLAINT:  Louisa presents today for chest discomfort.    HISTORY OF PRESENT ILLNESS:  She reports experiencing chest discomfort for about two weeks. The discomfort generally occurs in the evening, presenting as a dull pain in the center of the chest. She describes the pain as feeling superficial and lasting for about a second. The episodes occur once or twice in the evening. She denies any radiation of the pain or experiencing the discomfort during daytime hours.    CARDIOVASCULAR:  She is currently taking blood pressure medication, with one dose in the morning and another in the evening.    NEUROLOGICAL:  She reports experiencing headaches occurring every other day, located at the top back of her head. She believes the headaches are stress-related.    SLEEP:  She generally sleeps throughout the night but consistently wakes up around 4:00-4:30 AM, which appears to be her body's natural alarm. She takes OTC sleeping pills to aid with sleep.    PSYCHIATRIC:  She reports a history of anxiety and was previously on anxiety medication approximately 5-6 years ago. She recalls being prescribed Xanax in the past, noting that it caused drowsiness. She acknowledges ongoing work-related stress, describing her job as consistently stressful since 1999. She is not managing stress well and is experiencing more anxieties in relation to her work environment.    MEDICATIONS:  She has been taking Ashwagandha for stress and anxiety management for approximately one to 1.5 months. She has discontinued phentermine due to undesirable side effects.    LIFESTYLE:  She  reports increased physical activity, walking on a treadmill 3-4 times per week and denies any weightlifting. She has been making efforts to improve her diet due to concerns about weight gain associated with aging. She moved to a new location 6-7 months ago.    FAMILY HISTORY:  She reports a family history of diabetes and hypertension. Her mother has both diabetes and high blood pressure. Her middle sister has diabetes, while her younger sister has high blood pressure. She denies any family history of heart attacks.      ROS:  General: -fever, -chills, -fatigue, -weight gain, -weight loss  Eyes: -vision changes, -redness, -discharge  ENT: -ear pain, -nasal congestion, -sore throat  Cardiovascular: +chest pain, -palpitations, -lower extremity edema, -chest pressure  Respiratory: -cough, -shortness of breath  Gastrointestinal: -abdominal pain, -nausea, -vomiting, -diarrhea, -constipation, -blood in stool  Genitourinary: -dysuria, -hematuria, -frequency  Musculoskeletal: -joint pain, -muscle pain  Skin: -rash, -lesion  Neurological: +headache, -dizziness, -numbness, -tingling  Psychiatric: -anxiety, -depression, -sleep difficulty         Office Visit on 11/05/2024   Component Date Value Ref Range Status    EKG 12-Lead 11/05/2024    Final    Ventricular Rate 11/05/2024 94   Final    TX Interval 11/05/2024 164   Final    QRS Duration 11/05/2024 94   Final    QT/QTc 11/05/2024 370/462   Final    PRT Axes 11/05/2024 45/24/23   Final   Office Visit on 06/05/2024   Component Date Value Ref Range Status    WBC 06/05/2024 5.4  3.8 - 10.8 Thousand/uL Final    RBC 06/05/2024 4.66  3.80 - 5.10 Million/uL Final    Hemoglobin 06/05/2024 14.2  11.7 - 15.5 g/dL Final    Hematocrit 06/05/2024 42.7  35.0 - 45.0 % Final    MCV 06/05/2024 91.6  80.0 - 100.0 fL Final    MCH 06/05/2024 30.5  27.0 - 33.0 pg Final    MCHC 06/05/2024 33.3  32.0 - 36.0 g/dL Final    RDW 06/05/2024 13.1  11.0 - 15.0 % Final    Platelets 06/05/2024 278  140 - 400  Thousand/uL Final    MPV 06/05/2024 10.3  7.5 - 12.5 fL Final    Neutrophils, Abs 06/05/2024 2,057  1,500 - 7,800 cells/uL Final    Lymph # 06/05/2024 2,803  850 - 3,900 cells/uL Final    Mono # 06/05/2024 373  200 - 950 cells/uL Final    Eos # 06/05/2024 119  15 - 500 cells/uL Final    Baso # 06/05/2024 49  0 - 200 cells/uL Final    Neutrophils Relative 06/05/2024 38.1  % Final    Lymph % 06/05/2024 51.9  % Final    Mono % 06/05/2024 6.9  % Final    Eosinophil % 06/05/2024 2.2  % Final    Basophil % 06/05/2024 0.9  % Final    Glucose 06/05/2024 96  65 - 99 mg/dL Final    BUN 06/05/2024 12  7 - 25 mg/dL Final    Creatinine 06/05/2024 0.64  0.50 - 0.99 mg/dL Final    eGFR 06/05/2024 112  > OR = 60 mL/min/1.73m2 Final    BUN/Creatinine Ratio 06/05/2024 SEE NOTE:  6 - 22 (calc) Final    Sodium 06/05/2024 138  135 - 146 mmol/L Final    Potassium 06/05/2024 4.5  3.5 - 5.3 mmol/L Final    Chloride 06/05/2024 103  98 - 110 mmol/L Final    CO2 06/05/2024 30  20 - 32 mmol/L Final    Calcium 06/05/2024 9.6  8.6 - 10.2 mg/dL Final    Total Protein 06/05/2024 7.2  6.1 - 8.1 g/dL Final    Albumin 06/05/2024 4.1  3.6 - 5.1 g/dL Final    Globulin, Total 06/05/2024 3.1  1.9 - 3.7 g/dL (calc) Final    Albumin/Globulin Ratio 06/05/2024 1.3  1.0 - 2.5 (calc) Final    Total Bilirubin 06/05/2024 0.4  0.2 - 1.2 mg/dL Final    Alkaline Phosphatase 06/05/2024 67  31 - 125 U/L Final    AST 06/05/2024 16  10 - 30 U/L Final    ALT 06/05/2024 14  6 - 29 U/L Final    Cholesterol 06/05/2024 207 (H)  <200 mg/dL Final    HDL 06/05/2024 64  > OR = 50 mg/dL Final    Triglycerides 06/05/2024 93  <150 mg/dL Final    LDL Cholesterol 06/05/2024 124 (H)  mg/dL (calc) Final    HDL/Cholesterol Ratio 06/05/2024 3.2  <5.0 (calc) Final    Non HDL Chol. (LDL+VLDL) 06/05/2024 143 (H)  <130 mg/dL (calc) Final    Magnesium 06/05/2024 2.2  1.5 - 2.5 mg/dL Final    Creatinine, Urine 06/05/2024 61  20 - 275 mg/dL Final    Microalb, Ur 06/05/2024 <0.2  See Note:  mg/dL Final    Microalb/Creat Ratio 06/05/2024 NOTE  <30 mg/g creat Final    TSH w/reflex to FT4 06/05/2024 1.16  mIU/L Final    Color, UA 06/05/2024 YELLOW  YELLOW Final    Appearance, UA 06/05/2024 CLEAR  CLEAR Final    Specific Gravity, UA 06/05/2024 1.015  1.001 - 1.035 Final    pH, UA 06/05/2024 7.0  5.0 - 8.0 Final    Glucose, UA 06/05/2024 NEGATIVE  NEGATIVE Final    Bilirubin, UA 06/05/2024 NEGATIVE  NEGATIVE Final    Ketones, UA 06/05/2024 NEGATIVE  NEGATIVE Final    Occult Blood UA 06/05/2024 NEGATIVE  NEGATIVE Final    Protein, UA 06/05/2024 NEGATIVE  NEGATIVE Final    Nitrite, UA 06/05/2024 NEGATIVE  NEGATIVE Final    Leukocytes, UA 06/05/2024 NEGATIVE  NEGATIVE Final    WBC Casts, UA 06/05/2024 NONE SEEN  < OR = 5 /HPF Final    RBC Casts, UA 06/05/2024 NONE SEEN  < OR = 2 /HPF Final    Squam Epithel, UA 06/05/2024 NONE SEEN  < OR = 5 /HPF Final    Bacteria, UA 06/05/2024 NONE SEEN  NONE SEEN /HPF Final    Hyaline Casts, UA 06/05/2024 NONE SEEN  NONE SEEN /LPF Final    Service Cmt: 06/05/2024 SEE COMMENT   Final    Reflexive Urine Culture 06/05/2024 SEE COMMENT   Final    Vitamin D, 25-OH, Total 06/05/2024 19 (L)  30 - 100 ng/mL Final    Hemoglobin A1C 06/05/2024 5.8 (H)  <5.7 % of total Hgb Final       Past Medical History:   Diagnosis Date    Anemia     BRCA gene mutation negative     Class 3 severe obesity due to excess calories without serious comorbidity with body mass index (BMI) of 40.0 to 44.9 in adult 08/17/2022    Hypertension     Vitamin D deficiency 05/11/2018 5/8/2018: vit D 8.5       Social History     Socioeconomic History    Marital status: Single    Number of children: 2   Occupational History    Occupation:    Tobacco Use    Smoking status: Never    Smokeless tobacco: Never   Substance and Sexual Activity    Alcohol use: Not Currently    Drug use: Never     Social Drivers of Health     Financial Resource Strain: Low Risk  (6/4/2024)    Overall Financial Resource Strain  (CARDIA)     Difficulty of Paying Living Expenses: Not hard at all   Food Insecurity: No Food Insecurity (2024)    Hunger Vital Sign     Worried About Running Out of Food in the Last Year: Never true     Ran Out of Food in the Last Year: Never true   Physical Activity: Insufficiently Active (2024)    Exercise Vital Sign     Days of Exercise per Week: 2 days     Minutes of Exercise per Session: 20 min   Stress: Stress Concern Present (2024)    [employer] - Occupational Stress Questionnaire     Feeling of Stress : To some extent   Housing Stability: Unknown (2024)    Housing Stability Vital Sign     Unable to Pay for Housing in the Last Year: No     Past Surgical History:   Procedure Laterality Date     SECTION      HYSTERECTOMY      REDUCTION OF BOTH BREASTS      TOTAL REDUCTION MAMMOPLASTY      TUBAL LIGATION       Family History   Problem Relation Name Age of Onset    Diabetes Mother      No Known Problems Father      Breast cancer Sister  37    Hypertension Sister      Hypertension Sister      Ovarian cancer Neg Hx         The 10-year CVD risk score (Jack, et al., 2008) is: 3.5%    Values used to calculate the score:      Age: 43 years      Sex: Female      Diabetic: No      Tobacco smoker: No      Systolic Blood Pressure: 122 mmHg      Is BP treated: Yes      HDL Cholesterol: 64 mg/dL      Total Cholesterol: 207 mg/dL    All of your core healthy metrics are met.      Review of patient's allergies indicates:  No Known Allergies    Current Outpatient Medications:     amLODIPine (NORVASC) 5 MG tablet, Take 1 tablet (5 mg total) by mouth once daily., Disp: 90 tablet, Rfl: 3    APPLE CIDER VINEGAR ORAL, Take by mouth., Disp: , Rfl:     ASHWAGANDHA ROOT EXTRACT ORAL, Take by mouth., Disp: , Rfl:     cholecalciferol, vitamin D3, (VITAMIN D3) 125 mcg (5,000 unit) Tab, Take 5,000 Units by mouth once daily., Disp: , Rfl:     CYANOCOBALAMIN, VITAMIN B-12, ORAL,  "Take by mouth., Disp: , Rfl:     hydroCHLOROthiazide (HYDRODIURIL) 25 MG tablet, Take 1 tablet 3 times a week, Disp: 90 tablet, Rfl: 3    VITAMIN K2 ORAL, Take by mouth., Disp: , Rfl:     ALPRAZolam (XANAX) 0.25 MG tablet, Take 1 tablet (0.25 mg total) by mouth nightly as needed for Anxiety., Disp: 20 tablet, Rfl: 0    EScitalopram oxalate (LEXAPRO) 5 MG Tab, Take 1 tablet (5 mg total) by mouth once daily., Disp: 30 tablet, Rfl: 11    phentermine (ADIPEX-P) 37.5 mg tablet, Take 37.5 mg by mouth. (Patient not taking: Reported on 11/5/2024), Disp: , Rfl:     Review of Systems        Objective:      Vitals:    11/05/24 0954   BP: 122/84   Pulse: 90   SpO2: 99%   Weight: 101.6 kg (224 lb)   Height: 5' 2" (1.575 m)     Physical Exam  Vitals and nursing note reviewed.   Constitutional:       General: She is not in acute distress.     Appearance: Normal appearance. She is well-developed. She is not ill-appearing.   HENT:      Head: Normocephalic and atraumatic.      Right Ear: Tympanic membrane and external ear normal.      Left Ear: Tympanic membrane and external ear normal.      Nose: Nose normal.      Mouth/Throat:      Lips: Pink.      Pharynx: Oropharynx is clear.   Eyes:      General: No scleral icterus.     Pupils: Pupils are equal, round, and reactive to light.   Neck:      Thyroid: No thyromegaly.      Vascular: No carotid bruit.   Cardiovascular:      Rate and Rhythm: Normal rate and regular rhythm.      Pulses:           Radial pulses are 2+ on the right side and 2+ on the left side.      Heart sounds: Normal heart sounds. No murmur heard.  Pulmonary:      Effort: Pulmonary effort is normal.      Breath sounds: Normal breath sounds.   Abdominal:      General: Bowel sounds are normal.      Palpations: Abdomen is soft.      Tenderness: There is no abdominal tenderness.   Musculoskeletal:         General: Normal range of motion.      Cervical back: Normal range of motion.      Lumbar back: Normal. No spasms.      " Right lower leg: No edema.      Left lower leg: No edema.   Skin:     General: Skin is warm and dry.      Capillary Refill: Capillary refill takes less than 2 seconds.   Neurological:      General: No focal deficit present.      Mental Status: She is alert and oriented to person, place, and time.      Cranial Nerves: No cranial nerve deficit.      Sensory: Sensation is intact.      Motor: No weakness.      Gait: Gait is intact.   Psychiatric:         Attention and Perception: Attention normal.         Mood and Affect: Mood normal.         Speech: Speech normal.         Behavior: Behavior is cooperative.         Thought Content: Thought content does not include homicidal or suicidal ideation.       Physical Exam              Assessment:       1. Chest pain, unspecified type    2. Anxiety         Plan:             Assessment & Plan    Considered anxiety as likely cause of patient's chest discomfort, given low cardiac risk factors and episodic nature of symptoms  Recommend stress test to rule out cardiac issues, despite low likelihood  Will switch from herbal supplement (ashwagandha) to prescription SSRI (escitalopram) for anxiety management  Assessed potential for acid reflux or ulcer as alternative causes, but deemed less likely given symptom pattern    ANXIETY:  - Discussed common side effects of SSRIs, including initial drowsiness and GI symptoms.  - Started escitalopram (Lexapro) for anxiety management, to be taken in the morning.  - Prescribed low-dose alprazolam (Xanax) for occasional use during acute anxiety episodes, to be taken in the evening if needed.  - Follow up in 4-6 weeks, either in-person or virtual, to assess response to escitalopram.  - Contact office if experiencing intolerable side effects from escitalopram or worsening symptoms before scheduled follow-up.    HYPERTENSION:  - Louisa to take blood pressure readings when experiencing headache or chest discomfort.  - Continued current blood  pressure medications (hydrochlorothiazide in morning, evening medication).    CARDIOVASCULAR HEALTH:  - Explained EKG limitations in diagnosing heart issues outside of acute events.  - Treadmill stress test ordered to be conducted at hospital cardiology department.    EXERCISE ROUTINE:  - Louisa to continue current exercise routine of using treadmill 3-4 times per week.    MEDICATIONS/SUPPLEMENTS:  - Informed about potential for vivid dreams with melatonin use.  - Discontinued ashwagandha supplement.     Chest pain, unspecified type  -     POCT EKG 12-LEAD (Manually Resulted by Ordering Provider)  -     Exercise Stress - EKG; Future    Anxiety  -     EScitalopram oxalate (LEXAPRO) 5 MG Tab; Take 1 tablet (5 mg total) by mouth once daily.  Dispense: 30 tablet; Refill: 11  -     ALPRAZolam (XANAX) 0.25 MG tablet; Take 1 tablet (0.25 mg total) by mouth nightly as needed for Anxiety.  Dispense: 20 tablet; Refill: 0      Follow up for 4-6 week follow up in person or virtual. .        This note was generated with the assistance of ambient listening technology. Verbal consent was obtained by the patient and accompanying visitor(s) for the recording of patient appointment to facilitate this note. I attest to having reviewed and edited the generated note for accuracy, though some syntax or spelling errors may persist. Please contact the author of this note for any clarification.      11/5/2024 Elizabeth Duarte

## 2024-12-11 ENCOUNTER — TELEPHONE (OUTPATIENT)
Dept: FAMILY MEDICINE | Facility: CLINIC | Age: 44
End: 2024-12-11

## 2024-12-11 ENCOUNTER — OFFICE VISIT (OUTPATIENT)
Dept: FAMILY MEDICINE | Facility: CLINIC | Age: 44
End: 2024-12-11
Payer: COMMERCIAL

## 2024-12-11 VITALS
WEIGHT: 225 LBS | HEIGHT: 62 IN | OXYGEN SATURATION: 97 % | HEART RATE: 91 BPM | SYSTOLIC BLOOD PRESSURE: 110 MMHG | BODY MASS INDEX: 41.41 KG/M2 | DIASTOLIC BLOOD PRESSURE: 84 MMHG

## 2024-12-11 DIAGNOSIS — R09.82 POST-NASAL DRIP: ICD-10-CM

## 2024-12-11 DIAGNOSIS — J06.9 VIRAL URI WITH COUGH: ICD-10-CM

## 2024-12-11 DIAGNOSIS — I10 PRIMARY HYPERTENSION: ICD-10-CM

## 2024-12-11 DIAGNOSIS — F41.1 GENERALIZED ANXIETY DISORDER: Primary | ICD-10-CM

## 2024-12-11 PROCEDURE — 3074F SYST BP LT 130 MM HG: CPT | Mod: CPTII,S$GLB,,

## 2024-12-11 PROCEDURE — 3008F BODY MASS INDEX DOCD: CPT | Mod: CPTII,S$GLB,,

## 2024-12-11 PROCEDURE — 3044F HG A1C LEVEL LT 7.0%: CPT | Mod: CPTII,S$GLB,,

## 2024-12-11 PROCEDURE — 3061F NEG MICROALBUMINURIA REV: CPT | Mod: CPTII,S$GLB,,

## 2024-12-11 PROCEDURE — 1160F RVW MEDS BY RX/DR IN RCRD: CPT | Mod: CPTII,S$GLB,,

## 2024-12-11 PROCEDURE — 99213 OFFICE O/P EST LOW 20 MIN: CPT | Mod: S$GLB,,,

## 2024-12-11 PROCEDURE — 1159F MED LIST DOCD IN RCRD: CPT | Mod: CPTII,S$GLB,,

## 2024-12-11 PROCEDURE — 3079F DIAST BP 80-89 MM HG: CPT | Mod: CPTII,S$GLB,,

## 2024-12-11 PROCEDURE — 3066F NEPHROPATHY DOC TX: CPT | Mod: CPTII,S$GLB,,

## 2024-12-11 RX ORDER — BENZONATATE 200 MG/1
200 CAPSULE ORAL 3 TIMES DAILY PRN
Qty: 30 CAPSULE | Refills: 1 | Status: SHIPPED | OUTPATIENT
Start: 2024-12-11 | End: 2024-12-21

## 2024-12-11 RX ORDER — CODEINE PHOSPHATE AND GUAIFENESIN 10; 100 MG/5ML; MG/5ML
10 SOLUTION ORAL EVERY 8 HOURS PRN
Qty: 210 ML | Refills: 0 | Status: SHIPPED | OUTPATIENT
Start: 2024-12-11 | End: 2024-12-18

## 2024-12-11 RX ORDER — FLUTICASONE PROPIONATE 50 MCG
1 SPRAY, SUSPENSION (ML) NASAL DAILY
Qty: 16 G | Refills: 2 | Status: SHIPPED | OUTPATIENT
Start: 2024-12-11

## 2024-12-11 RX ORDER — CODEINE PHOSPHATE AND GUAIFENESIN 10; 100 MG/5ML; MG/5ML
10 SOLUTION ORAL EVERY 8 HOURS PRN
Qty: 210 ML | Refills: 0 | Status: SHIPPED | OUTPATIENT
Start: 2024-12-11 | End: 2024-12-11 | Stop reason: SDUPTHER

## 2024-12-11 NOTE — TELEPHONE ENCOUNTER
Spoke with pt who states that she would like medication sent to [facility] on [address].

## 2024-12-11 NOTE — TELEPHONE ENCOUNTER
----- Message from Elizabeth sent at 12/11/2024 11:47 AM CST -----  - 10:50-[facility] is calling and they do stock the cough medication. Please send to another pharmacy   [phone]

## 2024-12-11 NOTE — PROGRESS NOTES
SUBJECTIVE:    Patient ID: Louisa Soriano is a 44 y.o. female.    Chief Complaint: Follow-up (No bottles//Pt here for 4-6 week follow up//declines flu vacc//JL)    HPI  History of Present Illness    CHIEF COMPLAINT:  Louisa presents today for follow-up on anxiety medication and reports feeling better.    CURRENT ILLNESS:  She woke up with nasal drainage and a scratchy throat. She reports clear nasal discharge and denies fever or chills. She reports becoming wheezy with respiratory illnesses    ANXIETY MANAGEMENT:  She reports significant improvement in focus and sleep quality, noting a reduction in nighttime mental noise and improved ability to keep thoughts straight. She is taking Escitalopram (Lexapro) as prescribed and using Xanax as needed. She is not taking ashwagandha.    BLOOD PRESSURE:  Her blood pressure is well-controlled on the current regimen. She reports taking her blood pressure medication as prescribed.    IMMUNIZATIONS:  She declines flu vaccine, reporting never receiving flu vaccinations in the past.      ROS:  General: -fever, -chills, -fatigue, -weight gain, -weight loss  Eyes: -vision changes, -redness, -discharge  ENT: -ear pain, +nasal congestion, -sore throat  Cardiovascular: -chest pain, -palpitations, -lower extremity edema  Respiratory: -cough, -shortness of breath, +wheezing  Gastrointestinal: -abdominal pain, -nausea, -vomiting, -diarrhea, -constipation, -blood in stool  Genitourinary: -dysuria, -hematuria, -frequency  Musculoskeletal: -joint pain, -muscle pain  Skin: -rash, -lesion  Neurological: -headache, -dizziness, -numbness, -tingling  Psychiatric: +anxiety, -depression, -sleep difficulty         Office Visit on 11/05/2024   Component Date Value Ref Range Status    EKG 12-Lead 11/05/2024    Final    Ventricular Rate 11/05/2024 94   Final    [place] Interval 11/05/2024 164   Final    QRS Duration 11/05/2024 94   Final    QT/QTc 11/05/2024 370/462   Final    PRT Axes 11/05/2024 45/24/23    Final       Past Medical History:   Diagnosis Date    Anemia     BRCA gene mutation negative     Class 3 severe obesity due to excess calories without serious comorbidity with body mass index (BMI) of 40.0 to 44.9 in adult 2022    Hypertension     Vitamin D deficiency 2018: vit D 8.5       Social History     Socioeconomic History    Marital status: Single    Number of children: 2   Occupational History    Occupation:    Tobacco Use    Smoking status: Never    Smokeless tobacco: Never   Substance and Sexual Activity    Alcohol use: Not Currently    Drug use: Never     Social Drivers of Health     Financial Resource Strain: Low Risk  (2024)    Overall Financial Resource Strain (CARDIA)     Difficulty of Paying Living Expenses: Not hard at all   Food Insecurity: No Food Insecurity (2024)    Hunger Vital Sign     Worried About Running Out of Food in the Last Year: Never true     Ran Out of Food in the Last Year: Never true   Physical Activity: Insufficiently Active (2024)    Exercise Vital Sign     Days of Exercise per Week: 2 days     Minutes of Exercise per Session: 20 min   Stress: Stress Concern Present (2024)    [place] [employer] - Occupational Stress Questionnaire     Feeling of Stress : To some extent   Housing Stability: Unknown (2024)    Housing Stability Vital Sign     Unable to Pay for Housing in the Last Year: No     Past Surgical History:   Procedure Laterality Date     SECTION      HYSTERECTOMY      REDUCTION OF BOTH BREASTS      TOTAL REDUCTION MAMMOPLASTY      TUBAL LIGATION       Family History   Problem Relation Name Age of Onset    Diabetes Mother      No Known Problems Father      Breast cancer Sister  37    Hypertension Sister      Hypertension Sister      Ovarian cancer Neg Hx         The 10-year CVD risk score (DONALDAgofabricio, et al., 2008) is: 2.7%    Values used to calculate the score:      Age: 44 years       "Sex: Female      Diabetic: No      Tobacco smoker: No      Systolic Blood Pressure: 110 mmHg      Is BP treated: Yes      HDL Cholesterol: 64 mg/dL      Total Cholesterol: 207 mg/dL    All of your core healthy metrics are met.      Review of patient's allergies indicates:  No Known Allergies    Current Outpatient Medications:     ALPRAZolam (XANAX) 0.25 MG tablet, Take 1 tablet (0.25 mg total) by mouth nightly as needed for Anxiety., Disp: 20 tablet, Rfl: 0    amLODIPine (NORVASC) 5 MG tablet, Take 1 tablet (5 mg total) by mouth once daily., Disp: 90 tablet, Rfl: 3    APPLE CIDER VINEGAR ORAL, Take by mouth., Disp: , Rfl:     cholecalciferol, vitamin D3, (VITAMIN D3) 125 mcg (5,000 unit) Tab, Take 5,000 Units by mouth once daily., Disp: , Rfl:     CYANOCOBALAMIN, VITAMIN B-12, ORAL, Take by mouth., Disp: , Rfl:     EScitalopram oxalate (LEXAPRO) 5 MG Tab, Take 1 tablet (5 mg total) by mouth once daily., Disp: 30 tablet, Rfl: 11    hydroCHLOROthiazide (HYDRODIURIL) 25 MG tablet, Take 1 tablet 3 times a week, Disp: 90 tablet, Rfl: 3    VITAMIN K2 ORAL, Take by mouth., Disp: , Rfl:     benzonatate (TESSALON) 200 MG capsule, Take 1 capsule (200 mg total) by mouth 3 (three) times daily as needed for Cough., Disp: 30 capsule, Rfl: 1    fluticasone propionate (FLONASE) 50 mcg/actuation nasal spray, 1 spray (50 mcg total) by Each Nostril route once daily., Disp: 16 g, Rfl: 2    guaiFENesin-codeine 100-10 mg/5 ml (TUSSI-ORGANIDIN NR)  mg/5 mL syrup, Take 10 mLs by mouth every 8 (eight) hours as needed., Disp: 210 mL, Rfl: 0    Review of Systems        Objective:      Vitals:    12/11/24 0857   BP: 110/84   Pulse: 91   SpO2: 97%   Weight: 102.1 kg (225 lb)   Height: 5' 2" (1.575 m)     Physical Exam  Physical Exam    Constitutional: In no acute distress. Normal appearance. Well-developed. Not ill-appearing.  HENT: Normocephalic. Atraumatic. External ears normal bilaterally. Nose normal. Normal lips. Throat red and " inflamed. Turbinates inflamed and almost touching. Frequent, dry cough  Eyes: No scleral icterus. Pupils are equal, round, and reactive to light.  Cardiovascular: Normal rate and regular rhythm.   Pulmonary: Pulmonary effort is normal. Normal breath sounds.  Neurological: No focal deficit present. Gait is intact.  .           Assessment:       1. Generalized anxiety disorder    2. Primary hypertension    3. Post-nasal drip    4. Viral URI with cough         Plan:         Assessment & Plan    IMPRESSION:  - Assessed patient's response to Lexapro (escitalopram) and Xanax, noting improvement in anxiety symptoms, focus, and sleep  - Evaluated current upper respiratory symptoms, suspecting viral infection or allergies based on presentation of clear nasal discharge and inflamed nasal passages  - Determined no immediate need for antibiotics, opting for symptomatic treatment and monitoring  - Considered patient's history of wheezing with respiratory infections for potential future management    COMMON COLD (ACUTE NASOPHARYNGITIS):  Viral URI with cough  -     fluticasone propionate (FLONASE) 50 mcg/actuation nasal spray; 1 spray (50 mcg total) by Each Nostril route once daily.  Dispense: 16 g; Refill: 2  -     benzonatate (TESSALON) 200 MG capsule; Take 1 capsule (200 mg total) by mouth 3 (three) times daily as needed for Cough.  Dispense: 30 capsule; Refill: 1  -     guaiFENesin-codeine 100-10 mg/5 ml (TUSSI-ORGANIDIN NR)  mg/5 mL syrup; Take 10 mLs by mouth every 8 (eight) hours as needed.  Dispense: 210 mL; Refill: 0    Post-nasal drip  -     fluticasone propionate (FLONASE) 50 mcg/actuation nasal spray; 1 spray (50 mcg total) by Each Nostril route once daily.  Dispense: 16 g; Refill: 2  - Explained the difference between allergic and viral nasal inflammation.  - Discussed the importance of thinning mucus secretions rather than drying them up to prevent sinus complications.  - Advised on the benefits of chicken  soup for illness, noting its protein content and healing properties.  - Louisa to use saline nasal rinses to clear sinuses.  - Recommend consuming hot liquids like tea and broth for sore throat relief.  - Louisa to rest and stay home if symptoms worsen or fever develops.  - Started Flonase nasal spray for nasal inflammation.  - Started Tessalon pearls for dry cough and throat discomfort.  - Started Robitussin with codeine for cough suppression and mucus break-up, with caution not to drive while using.  - Recommend OTC options: DayQuil or other multi-symptom cold medication containing phenylephrine for nasal congestion, Chloraseptic drops for sore throat relief.  - Contact the office if: Mucus becomes discolored (yellow, orange, brown, dark green, or gray), Symptoms worsen or persist for several days, Fever or chills develop, Wheezing or difficulty breathing occurs.  - Contact the office tomorrow or Friday morning if further treatment is needed before the weekend.    ANXIETY DISORDER:  Generalized anxiety disorder  - Continued Lexapro (escitalopram) at current dose.  - Continued Xanax as needed for anxiety.    ESSENTIAL HYPERTENSION:  Primary hypertension  - Continued current blood pressure medication.    FOLLOW-UP:  - Follow up in 6 months for routine follow-up.         Follow up in about 6 months (around 6/11/2025), or if symptoms worsen or fail to improve.        This note was generated with the assistance of ambient listening technology. Verbal consent was obtained by the patient and accompanying visitor(s) for the recording of patient appointment to facilitate this note. I attest to having reviewed and edited the generated note for accuracy, though some syntax or spelling errors may persist. Please contact the author of this note for any clarification.      12/11/2024 Elizabeth Duarte

## 2024-12-12 ENCOUNTER — TELEPHONE (OUTPATIENT)
Dept: FAMILY MEDICINE | Facility: CLINIC | Age: 44
End: 2024-12-12
Payer: COMMERCIAL

## 2024-12-12 DIAGNOSIS — B96.89 BACTERIAL SINUSITIS: Primary | ICD-10-CM

## 2024-12-12 DIAGNOSIS — J06.9 VIRAL URI WITH COUGH: ICD-10-CM

## 2024-12-12 DIAGNOSIS — J32.9 BACTERIAL SINUSITIS: Primary | ICD-10-CM

## 2024-12-12 RX ORDER — AZITHROMYCIN 250 MG/1
TABLET, FILM COATED ORAL
Qty: 6 TABLET | Refills: 0 | Status: SHIPPED | OUTPATIENT
Start: 2024-12-12 | End: 2024-12-17

## 2024-12-12 RX ORDER — ALBUTEROL SULFATE 90 UG/1
1 INHALANT RESPIRATORY (INHALATION) EVERY 6 HOURS PRN
Qty: 18 G | Refills: 0 | Status: SHIPPED | OUTPATIENT
Start: 2024-12-12 | End: 2025-12-12

## 2024-12-12 NOTE — TELEPHONE ENCOUNTER
Pt states her cold has gotten worse.   States still very congested. She was advised to call back if not feeling any better and Lizzy would prescribe her something else.

## 2024-12-12 NOTE — TELEPHONE ENCOUNTER
----- Message from Elif sent at 12/12/2024 12:54 PM CST -----  Pt needs to speak with a nurse.  [phone]

## 2025-05-27 ENCOUNTER — TELEPHONE (OUTPATIENT)
Dept: FAMILY MEDICINE | Facility: CLINIC | Age: 45
End: 2025-05-27
Payer: COMMERCIAL

## 2025-05-27 DIAGNOSIS — Z00.00 ROUTINE GENERAL MEDICAL EXAMINATION AT A HEALTH CARE FACILITY: ICD-10-CM

## 2025-05-27 DIAGNOSIS — Z51.81 ENCOUNTER FOR THERAPEUTIC DRUG MONITORING: ICD-10-CM

## 2025-05-27 DIAGNOSIS — Z79.899 ENCOUNTER FOR LONG-TERM (CURRENT) USE OF HIGH-RISK MEDICATION: ICD-10-CM

## 2025-05-27 DIAGNOSIS — I10 PRIMARY HYPERTENSION: Primary | ICD-10-CM

## 2025-06-11 ENCOUNTER — OFFICE VISIT (OUTPATIENT)
Dept: FAMILY MEDICINE | Facility: CLINIC | Age: 45
End: 2025-06-11
Payer: COMMERCIAL

## 2025-06-11 VITALS
BODY MASS INDEX: 38.48 KG/M2 | WEIGHT: 217.19 LBS | SYSTOLIC BLOOD PRESSURE: 118 MMHG | HEART RATE: 96 BPM | DIASTOLIC BLOOD PRESSURE: 88 MMHG | HEIGHT: 63 IN

## 2025-06-11 DIAGNOSIS — Z13.228 SCREENING FOR ENDOCRINE, NUTRITIONAL, METABOLIC AND IMMUNITY DISORDER: ICD-10-CM

## 2025-06-11 DIAGNOSIS — Z13.220 ENCOUNTER FOR LIPID SCREENING FOR CARDIOVASCULAR DISEASE: ICD-10-CM

## 2025-06-11 DIAGNOSIS — Z12.31 OTHER SCREENING MAMMOGRAM: ICD-10-CM

## 2025-06-11 DIAGNOSIS — I10 PRIMARY HYPERTENSION: ICD-10-CM

## 2025-06-11 DIAGNOSIS — Z13.0 SCREENING FOR ENDOCRINE, NUTRITIONAL, METABOLIC AND IMMUNITY DISORDER: ICD-10-CM

## 2025-06-11 DIAGNOSIS — Z13.21 SCREENING FOR ENDOCRINE, NUTRITIONAL, METABOLIC AND IMMUNITY DISORDER: ICD-10-CM

## 2025-06-11 DIAGNOSIS — Z00.00 WELLNESS EXAMINATION: Primary | ICD-10-CM

## 2025-06-11 DIAGNOSIS — F41.9 ANXIETY: ICD-10-CM

## 2025-06-11 DIAGNOSIS — Z13.29 SCREENING FOR ENDOCRINE, NUTRITIONAL, METABOLIC AND IMMUNITY DISORDER: ICD-10-CM

## 2025-06-11 DIAGNOSIS — Z13.6 ENCOUNTER FOR LIPID SCREENING FOR CARDIOVASCULAR DISEASE: ICD-10-CM

## 2025-06-11 PROCEDURE — 3074F SYST BP LT 130 MM HG: CPT | Mod: CPTII,S$GLB,,

## 2025-06-11 PROCEDURE — 99396 PREV VISIT EST AGE 40-64: CPT | Mod: S$GLB,,,

## 2025-06-11 PROCEDURE — 3061F NEG MICROALBUMINURIA REV: CPT | Mod: CPTII,S$GLB,,

## 2025-06-11 PROCEDURE — 3066F NEPHROPATHY DOC TX: CPT | Mod: CPTII,S$GLB,,

## 2025-06-11 PROCEDURE — 3044F HG A1C LEVEL LT 7.0%: CPT | Mod: CPTII,S$GLB,,

## 2025-06-11 PROCEDURE — 3079F DIAST BP 80-89 MM HG: CPT | Mod: CPTII,S$GLB,,

## 2025-06-11 PROCEDURE — 3008F BODY MASS INDEX DOCD: CPT | Mod: CPTII,S$GLB,,

## 2025-06-11 PROCEDURE — 1160F RVW MEDS BY RX/DR IN RCRD: CPT | Mod: CPTII,S$GLB,,

## 2025-06-11 PROCEDURE — 1159F MED LIST DOCD IN RCRD: CPT | Mod: CPTII,S$GLB,,

## 2025-06-11 RX ORDER — ESCITALOPRAM OXALATE 5 MG/1
5 TABLET ORAL DAILY
Qty: 30 TABLET | Refills: 11 | Status: CANCELLED | OUTPATIENT
Start: 2025-06-11 | End: 2026-06-11

## 2025-06-11 RX ORDER — HYDROCHLOROTHIAZIDE 25 MG/1
TABLET ORAL
Qty: 90 TABLET | Refills: 3 | Status: SHIPPED | OUTPATIENT
Start: 2025-06-11

## 2025-06-11 RX ORDER — ESCITALOPRAM OXALATE 10 MG/1
10 TABLET ORAL DAILY
Qty: 90 TABLET | Refills: 3 | Status: SHIPPED | OUTPATIENT
Start: 2025-06-11 | End: 2026-06-11

## 2025-06-11 RX ORDER — AMLODIPINE BESYLATE 5 MG/1
5 TABLET ORAL DAILY
Qty: 90 TABLET | Refills: 3 | Status: SHIPPED | OUTPATIENT
Start: 2025-06-11 | End: 2026-06-11

## 2025-06-12 LAB
ALBUMIN SERPL-MCNC: 4.3 G/DL (ref 3.6–5.1)
ALBUMIN/CREAT UR: 4 MG/G CREAT
ALBUMIN/GLOB SERPL: 1.4 (CALC) (ref 1–2.5)
ALP SERPL-CCNC: 66 U/L (ref 31–125)
ALT SERPL-CCNC: 20 U/L (ref 6–29)
AST SERPL-CCNC: 21 U/L (ref 10–30)
BASOPHILS # BLD AUTO: 39 CELLS/UL (ref 0–200)
BASOPHILS NFR BLD AUTO: 0.7 %
BILIRUB SERPL-MCNC: 0.4 MG/DL (ref 0.2–1.2)
BUN SERPL-MCNC: 9 MG/DL (ref 7–25)
BUN/CREAT SERPL: NORMAL (CALC) (ref 6–22)
CALCIUM SERPL-MCNC: 9.8 MG/DL (ref 8.6–10.2)
CHLORIDE SERPL-SCNC: 102 MMOL/L (ref 98–110)
CHOLEST SERPL-MCNC: 234 MG/DL
CHOLEST/HDLC SERPL: 3.5 (CALC)
CO2 SERPL-SCNC: 29 MMOL/L (ref 20–32)
CREAT SERPL-MCNC: 0.71 MG/DL (ref 0.5–0.99)
CREAT UR-MCNC: 263 MG/DL (ref 20–275)
EGFR: 107 ML/MIN/1.73M2
EOSINOPHIL # BLD AUTO: 90 CELLS/UL (ref 15–500)
EOSINOPHIL NFR BLD AUTO: 1.6 %
ERYTHROCYTE [DISTWIDTH] IN BLOOD BY AUTOMATED COUNT: 13.5 % (ref 11–15)
GLOBULIN SER CALC-MCNC: 3 G/DL (CALC) (ref 1.9–3.7)
GLUCOSE SERPL-MCNC: 79 MG/DL (ref 65–99)
HBA1C MFR BLD: 5.4 %
HCT VFR BLD AUTO: 46.2 % (ref 35–45)
HDLC SERPL-MCNC: 67 MG/DL
HGB BLD-MCNC: 15 G/DL (ref 11.7–15.5)
LDLC SERPL CALC-MCNC: 148 MG/DL (CALC)
LYMPHOCYTES # BLD AUTO: 2671 CELLS/UL (ref 850–3900)
LYMPHOCYTES NFR BLD AUTO: 47.7 %
MCH RBC QN AUTO: 30 PG (ref 27–33)
MCHC RBC AUTO-ENTMCNC: 32.5 G/DL (ref 32–36)
MCV RBC AUTO: 92.4 FL (ref 80–100)
MICROALBUMIN UR-MCNC: 1 MG/DL
MONOCYTES # BLD AUTO: 386 CELLS/UL (ref 200–950)
MONOCYTES NFR BLD AUTO: 6.9 %
NEUTROPHILS # BLD AUTO: 2414 CELLS/UL (ref 1500–7800)
NEUTROPHILS NFR BLD AUTO: 43.1 %
NONHDLC SERPL-MCNC: 167 MG/DL (CALC)
PLATELET # BLD AUTO: 300 THOUSAND/UL (ref 140–400)
PMV BLD REES-ECKER: 10.3 FL (ref 7.5–12.5)
POTASSIUM SERPL-SCNC: 4.2 MMOL/L (ref 3.5–5.3)
PROT SERPL-MCNC: 7.3 G/DL (ref 6.1–8.1)
RBC # BLD AUTO: 5 MILLION/UL (ref 3.8–5.1)
SODIUM SERPL-SCNC: 140 MMOL/L (ref 135–146)
TRIGL SERPL-MCNC: 84 MG/DL
TSH SERPL-ACNC: 0.67 MIU/L
WBC # BLD AUTO: 5.6 THOUSAND/UL (ref 3.8–10.8)

## 2025-06-16 ENCOUNTER — RESULTS FOLLOW-UP (OUTPATIENT)
Dept: FAMILY MEDICINE | Facility: CLINIC | Age: 45
End: 2025-06-16

## 2025-06-16 DIAGNOSIS — R92.8 ABNORMALITY OF RIGHT BREAST ON SCREENING MAMMOGRAPHY: Primary | ICD-10-CM

## 2025-06-23 NOTE — PROGRESS NOTES
SUBJECTIVE:    Patient ID: Louisa Soriano is a 44 y.o. female.    Chief Complaint: Follow-up (No bottles, refills pended ; referral for mammogram entered; KD)    HPI  History of Present Illness    CHIEF COMPLAINT:  Louisa presents today for follow up    MENTAL HEALTH:  She reports generally manageable stress levels with improvement, though she continues to experience occasional difficult days. She takes Lexapro 5 mg daily, occasionally taking two tablets during periods of increased stress.    MEDICATIONS:  She takes hydrochlorothiazide 3 times per week for fluid management with reported effectiveness. She previously used Albuterol for an acute illness but no longer requires it.      ROS:  General: -fever, -chills, -fatigue, -weight gain, -weight loss  Eyes: -vision changes, -redness, -discharge  ENT: -ear pain, -nasal congestion, -sore throat  Cardiovascular: -chest pain, -palpitations, -lower extremity edema  Respiratory: -cough, -shortness of breath  Gastrointestinal: -abdominal pain, -nausea, -vomiting, -diarrhea, -constipation, -blood in stool  Genitourinary: -dysuria, -hematuria, -frequency  Musculoskeletal: -joint pain, -muscle pain  Skin: -rash, -lesion  Neurological: -headache, -dizziness, -numbness, -tingling  Psychiatric: -anxiety, -depression, -sleep difficulty         Office Visit on 06/11/2025   Component Date Value Ref Range Status    WBC 06/11/2025 5.6  3.8 - 10.8 Thousand/uL Final    RBC 06/11/2025 5.00  3.80 - 5.10 Million/uL Final    Hemoglobin 06/11/2025 15.0  11.7 - 15.5 g/dL Final    Hematocrit 06/11/2025 46.2 (H)  35.0 - 45.0 % Final    MCV 06/11/2025 92.4  80.0 - 100.0 fL Final    MCH 06/11/2025 30.0  27.0 - 33.0 pg Final    MCHC 06/11/2025 32.5  32.0 - 36.0 g/dL Final    RDW 06/11/2025 13.5  11.0 - 15.0 % Final    Platelets 06/11/2025 300  140 - 400 Thousand/uL Final    MPV 06/11/2025 10.3  7.5 - 12.5 fL Final    Neutrophils, Abs 06/11/2025 2,414  1,500 - 7,800 cells/uL Final    Lymph #  06/11/2025 2,671  850 - 3,900 cells/uL Final    Mono # 06/11/2025 386  200 - 950 cells/uL Final    Eos # 06/11/2025 90  15 - 500 cells/uL Final    Baso # 06/11/2025 39  0 - 200 cells/uL Final    Neutrophils Relative 06/11/2025 43.1  % Final    Lymph % 06/11/2025 47.7  % Final    Mono % 06/11/2025 6.9  % Final    Eosinophil % 06/11/2025 1.6  % Final    Basophil % 06/11/2025 0.7  % Final    Glucose 06/11/2025 79  65 - 99 mg/dL Final    BUN 06/11/2025 9  7 - 25 mg/dL Final    Creatinine 06/11/2025 0.71  0.50 - 0.99 mg/dL Final    eGFR 06/11/2025 107  > OR = 60 mL/min/1.73m2 Final    BUN/Creatinine Ratio 06/11/2025 SEE NOTE:  6 - 22 (calc) Final    Sodium 06/11/2025 140  135 - 146 mmol/L Final    Potassium 06/11/2025 4.2  3.5 - 5.3 mmol/L Final    Chloride 06/11/2025 102  98 - 110 mmol/L Final    CO2 06/11/2025 29  20 - 32 mmol/L Final    Calcium 06/11/2025 9.8  8.6 - 10.2 mg/dL Final    Total Protein 06/11/2025 7.3  6.1 - 8.1 g/dL Final    Albumin 06/11/2025 4.3  3.6 - 5.1 g/dL Final    Globulin, Total 06/11/2025 3.0  1.9 - 3.7 g/dL (calc) Final    Albumin/Globulin Ratio 06/11/2025 1.4  1.0 - 2.5 (calc) Final    Total Bilirubin 06/11/2025 0.4  0.2 - 1.2 mg/dL Final    Alkaline Phosphatase 06/11/2025 66  31 - 125 U/L Final    AST 06/11/2025 21  10 - 30 U/L Final    ALT 06/11/2025 20  6 - 29 U/L Final    Cholesterol 06/11/2025 234 (H)  <200 mg/dL Final    HDL 06/11/2025 67  > OR = 50 mg/dL Final    Triglycerides 06/11/2025 84  <150 mg/dL Final    LDL Cholesterol 06/11/2025 148 (H)  mg/dL (calc) Final    HDL/Cholesterol Ratio 06/11/2025 3.5  <5.0 (calc) Final    Non HDL Chol. (LDL+VLDL) 06/11/2025 167 (H)  <130 mg/dL (calc) Final    Hemoglobin A1C 06/11/2025 5.4  <5.7 % Final    TSH w/reflex to FT4 06/11/2025 0.67  mIU/L Final    Creatinine, Urine 06/11/2025 263  20 - 275 mg/dL Final    Microalb, Ur 06/11/2025 1.0  See Note: mg/dL Final    Microalb/Creat Ratio 06/11/2025 4  <30 mg/g creat Final       Past Medical History:  "  Diagnosis Date    Anemia     BRCA gene mutation negative     Class 3 severe obesity due to excess calories without serious comorbidity with body mass index (BMI) of 40.0 to 44.9 in adult 2022    Hypertension     Vitamin D deficiency 2018: vit D 8.5       Social History[1]  Past Surgical History:   Procedure Laterality Date     SECTION      HYSTERECTOMY      REDUCTION OF BOTH BREASTS      TOTAL REDUCTION MAMMOPLASTY      TUBAL LIGATION       Family History   Problem Relation Name Age of Onset    Diabetes Mother      No Known Problems Father      Breast cancer Sister  37    Hypertension Sister      Hypertension Sister      Ovarian cancer Neg Hx         The 10-year CVD risk score (Jack et al., 2008) is: 3.7%    Values used to calculate the score:      Age: 44 years      Sex: Female      Diabetic: No      Tobacco smoker: No      Systolic Blood Pressure: 118 mmHg      Is BP treated: Yes      HDL Cholesterol: 67 mg/dL      Total Cholesterol: 234 mg/dL    All of your core healthy metrics are met.      Review of patient's allergies indicates:  No Known Allergies  Current Medications[2]    Review of Systems        Objective:      Vitals:    25 0804   BP: 118/88   Pulse: 96   Weight: 98.5 kg (217 lb 3.2 oz)   Height: 5' 2.5" (1.588 m)     Physical Exam  Physical Exam    Constitutional: In no acute distress. Normal appearance. Well-developed. Not ill-appearing.  HENT: Normocephalic. Atraumatic. External ears normal bilaterally. Nose normal. Normal lips. Oropharynx clear.  Eyes: No scleral icterus. Pupils are equal, round, and reactive to light.  Neck: No thyromegaly. No carotid bruit.  Cardiovascular: Normal rate and regular rhythm. Radial pulses are 2+ bilaterally. Normal heart sounds. No murmur heard.  Pulmonary: Pulmonary effort is normal. Normal breath sounds.  Abdomen: Bowel sounds are normal. Abdomen is soft. No abdominal tenderness.  Musculoskeletal: Normal range of motion " at all joints. Normal range of motion of the cervical back. No lumbar spasms. No lower extremity edema bilaterally.  Skin: Warm. Dry. Capillary refill takes less than 2 seconds.  Neurological: No focal deficit present. Alert and oriented to person, place, and time. No cranial nerve deficit. Sensation intact. No motor weakness. Gait is intact.  Psychiatric: Attention normal. Mood normal. Speech normal. Behavior is cooperative. No homicidal ideation. No suicidal ideation.           Assessment:       1. Wellness examination    2. Primary hypertension    3. Anxiety    4. Other screening mammogram    5. Screening for endocrine, nutritional, metabolic and immunity disorder    6. Encounter for lipid screening for cardiovascular disease         Plan:       Wellness examination  -     CBC Auto Differential; Future; Expected date: 06/11/2025  -     Comprehensive Metabolic Panel; Future; Expected date: 06/11/2025  -     Lipid Panel; Future; Expected date: 06/11/2025  -     Hemoglobin A1C; Future; Expected date: 06/11/2025  -     TSH w/reflex to FT4; Future; Expected date: 06/11/2025    Primary hypertension  -     amLODIPine (NORVASC) 5 MG tablet; Take 1 tablet (5 mg total) by mouth once daily.  Dispense: 90 tablet; Refill: 3  -     hydroCHLOROthiazide (HYDRODIURIL) 25 MG tablet; Take 1 tablet 3 times a week  Dispense: 90 tablet; Refill: 3  -     Microalbumin/Creatinine Ratio, Urine; Future; Expected date: 06/11/2025    Anxiety  -     EScitalopram oxalate (LEXAPRO) 10 MG tablet; Take 1 tablet (10 mg total) by mouth once daily.  Dispense: 90 tablet; Refill: 3    Other screening mammogram  -     Mammo Digital Screening Bilat w/ Rui (XPD); Future; Expected date: 06/11/2025    Screening for endocrine, nutritional, metabolic and immunity disorder  -     Hemoglobin A1C; Future; Expected date: 06/11/2025    Encounter for lipid screening for cardiovascular disease  -     Lipid Panel; Future; Expected date: 06/11/2025      Assessment &  Plan    I10 Essential (primary) hypertension  F41.9 Anxiety disorder, unspecified  E87.79 Other fluid overload    ## HYPERTENSION:  - Louisa's blood pressure is well-controlled with current medication regimen, with excellent readings noted.  - Continued amlodipine prescription for 1 year and hydrochlorothiazide 3 times weekly for fluid management and blood pressure control.  - Ordered urinalysis to check for protein as part of hypertension management.  - Discussed home blood pressure monitoring techniques with the patient.  - Noted correlation between improved stress levels and good blood pressure control.    ## ANXIETY DISORDER:  - Louisa reports manageable stress levels with improvement on Lexapro.  - Current 5mg dose is well-tolerated without side effects.  - Increased Lexapro dose to 10mg daily to better manage stress and anxiety, particularly during stressful periods.  - Discussed the benefits of this increased dosage for improved anxiety management.    ## FLUID MANAGEMENT:  - No edema or swelling observed; ankles appear normal.  - Current fluid management regimen is effective and satisfactory.    ## OTHER MEDICAL MANAGEMENT:  - Continued albuterol as needed for illness.  - Completed annual wellness visit.  - Ordered wellness labs including cholesterol, blood count, and thyroid screening.  - Ordered mammogram.  - Discussed upcoming need for colonoscopy screening at age 45 due to increasing incidence of early-onset colon cancer.  - Emphasized the importance of early detection in colon cancer screening, emphasizing the ability to identify and remove precancerous polyps, and reviewed the updated guidelines now recommending screening starting at age 45.  - Provided information on wellness visit coverage and associated labs.    ## FOLLOW-UP:  - Scheduled follow up in 1 year for annual well visit, with option for 6-month follow-up if preferred due to BP medication regimen.  - Louisa advised to return sooner if  any issues arise or BP changes occur.         Follow up in about 6 years (around 6/11/2031), or if symptoms worsen or fail to improve, for ANNUAL.        This note was generated with the assistance of ambient listening technology. Verbal consent was obtained by the patient and accompanying visitor(s) for the recording of patient appointment to facilitate this note. I attest to having reviewed and edited the generated note for accuracy, though some syntax or spelling errors may persist. Please contact the author of this note for any clarification.      6/22/2025 Elizabeth Duarte         [1]   Social History  Socioeconomic History    Marital status: Single    Number of children: 2   Occupational History    Occupation:    Tobacco Use    Smoking status: Never    Smokeless tobacco: Never   Substance and Sexual Activity    Alcohol use: Not Currently    Drug use: Never     Social Drivers of Health     Financial Resource Strain: Low Risk  (6/4/2024)    Overall Financial Resource Strain (CARDIA)     Difficulty of Paying Living Expenses: Not hard at all   Food Insecurity: No Food Insecurity (6/4/2024)    Hunger Vital Sign     Worried About Running Out of Food in the Last Year: Never true     Ran Out of Food in the Last Year: Never true   Physical Activity: Insufficiently Active (6/4/2024)    Exercise Vital Sign     Days of Exercise per Week: 2 days     Minutes of Exercise per Session: 20 min   Stress: Stress Concern Present (6/4/2024)    [employer] [place] [employer] - Occupational Stress Questionnaire     Feeling of Stress : To some extent   Housing Stability: Unknown (6/4/2024)    Housing Stability Vital Sign     Unable to Pay for Housing in the Last Year: No   [2]   Current Outpatient Medications:     albuterol (VENTOLIN HFA) 90 mcg/actuation inhaler, Inhale 1 puff into the lungs every 6 (six) hours as needed for Wheezing. Rescue, Disp: 18 g, Rfl: 0    ALPRAZolam (XANAX) 0.25 MG tablet, Take 1  tablet (0.25 mg total) by mouth nightly as needed for Anxiety., Disp: 20 tablet, Rfl: 0    cholecalciferol, vitamin D3, (VITAMIN D3) 125 mcg (5,000 unit) Tab, Take 5,000 Units by mouth once daily., Disp: , Rfl:     CYANOCOBALAMIN, VITAMIN B-12, ORAL, Take by mouth., Disp: , Rfl:     fluticasone propionate (FLONASE) 50 mcg/actuation nasal spray, 1 spray (50 mcg total) by Each Nostril route once daily., Disp: 16 g, Rfl: 2    VITAMIN K2 ORAL, Take by mouth., Disp: , Rfl:     amLODIPine (NORVASC) 5 MG tablet, Take 1 tablet (5 mg total) by mouth once daily., Disp: 90 tablet, Rfl: 3    APPLE CIDER VINEGAR ORAL, Take by mouth. (Patient not taking: Reported on 6/11/2025), Disp: , Rfl:     EScitalopram oxalate (LEXAPRO) 10 MG tablet, Take 1 tablet (10 mg total) by mouth once daily., Disp: 90 tablet, Rfl: 3    hydroCHLOROthiazide (HYDRODIURIL) 25 MG tablet, Take 1 tablet 3 times a week, Disp: 90 tablet, Rfl: 3

## 2025-07-09 ENCOUNTER — HOSPITAL ENCOUNTER (OUTPATIENT)
Dept: RADIOLOGY | Facility: HOSPITAL | Age: 45
Discharge: HOME | End: 2025-07-09
Payer: COMMERCIAL

## 2025-07-09 DIAGNOSIS — Z12.31 OTHER SCREENING MAMMOGRAM: ICD-10-CM

## 2025-07-09 PROCEDURE — 77067 SCR MAMMO BI INCL CAD: CPT | Mod: 26,,, | Performed by: RADIOLOGY

## 2025-07-09 PROCEDURE — 77063 BREAST TOMOSYNTHESIS BI: CPT | Mod: 26,,, | Performed by: RADIOLOGY

## 2025-07-09 PROCEDURE — 77067 SCR MAMMO BI INCL CAD: CPT | Mod: TC,PO

## 2025-07-22 ENCOUNTER — HOSPITAL ENCOUNTER (OUTPATIENT)
Dept: RADIOLOGY | Facility: HOSPITAL | Age: 45
Discharge: HOME | End: 2025-07-22
Payer: COMMERCIAL

## 2025-07-22 DIAGNOSIS — R92.8 ABNORMALITY OF RIGHT BREAST ON SCREENING MAMMOGRAPHY: ICD-10-CM

## 2025-07-22 PROCEDURE — 77061 BREAST TOMOSYNTHESIS UNI: CPT | Mod: 26,RT,, | Performed by: RADIOLOGY

## 2025-07-22 PROCEDURE — 77065 DX MAMMO INCL CAD UNI: CPT | Mod: TC,PO,RT

## 2025-07-22 PROCEDURE — 77065 DX MAMMO INCL CAD UNI: CPT | Mod: 26,RT,, | Performed by: RADIOLOGY
